# Patient Record
Sex: MALE | Race: WHITE | NOT HISPANIC OR LATINO | Employment: FULL TIME | URBAN - METROPOLITAN AREA
[De-identification: names, ages, dates, MRNs, and addresses within clinical notes are randomized per-mention and may not be internally consistent; named-entity substitution may affect disease eponyms.]

---

## 2019-10-03 ENCOUNTER — HOSPITAL ENCOUNTER (EMERGENCY)
Facility: HOSPITAL | Age: 49
Discharge: HOME | End: 2019-10-03
Attending: EMERGENCY MEDICINE | Admitting: EMERGENCY MEDICINE

## 2019-10-03 VITALS
HEIGHT: 71 IN | BODY MASS INDEX: 43.4 KG/M2 | RESPIRATION RATE: 16 BRPM | WEIGHT: 310 LBS | OXYGEN SATURATION: 98 % | HEART RATE: 83 BPM | TEMPERATURE: 98.1 F | DIASTOLIC BLOOD PRESSURE: 91 MMHG | SYSTOLIC BLOOD PRESSURE: 153 MMHG

## 2019-10-03 DIAGNOSIS — I10 ESSENTIAL HYPERTENSION: Primary | ICD-10-CM

## 2019-10-03 LAB
ALBUMIN SERPL-MCNC: 4.6 G/DL (ref 3.5–5.2)
ALBUMIN/GLOB SERPL: 1.7 G/DL
ALP SERPL-CCNC: 112 U/L (ref 39–117)
ALT SERPL W P-5'-P-CCNC: 22 U/L (ref 1–41)
ANION GAP SERPL CALCULATED.3IONS-SCNC: 16.1 MMOL/L (ref 5–15)
AST SERPL-CCNC: 19 U/L (ref 1–40)
BILIRUB SERPL-MCNC: 0.3 MG/DL (ref 0.2–1.2)
BUN BLD-MCNC: 10 MG/DL (ref 6–20)
BUN/CREAT SERPL: 9.7 (ref 7–25)
CALCIUM SPEC-SCNC: 9.4 MG/DL (ref 8.6–10.5)
CHLORIDE SERPL-SCNC: 101 MMOL/L (ref 98–107)
CO2 SERPL-SCNC: 28.9 MMOL/L (ref 22–29)
CREAT BLD-MCNC: 1.03 MG/DL (ref 0.76–1.27)
GFR SERPL CREATININE-BSD FRML MDRD: 77 ML/MIN/1.73
GLOBULIN UR ELPH-MCNC: 2.7 GM/DL
GLUCOSE BLD-MCNC: 90 MG/DL (ref 65–99)
POTASSIUM BLD-SCNC: 3.6 MMOL/L (ref 3.5–5.2)
PROT SERPL-MCNC: 7.3 G/DL (ref 6–8.5)
SODIUM BLD-SCNC: 146 MMOL/L (ref 136–145)
TROPONIN T SERPL-MCNC: <0.01 NG/ML (ref 0–0.03)

## 2019-10-03 PROCEDURE — 93005 ELECTROCARDIOGRAM TRACING: CPT | Performed by: EMERGENCY MEDICINE

## 2019-10-03 PROCEDURE — 80053 COMPREHEN METABOLIC PANEL: CPT | Performed by: EMERGENCY MEDICINE

## 2019-10-03 PROCEDURE — 93010 ELECTROCARDIOGRAM REPORT: CPT | Performed by: INTERNAL MEDICINE

## 2019-10-03 PROCEDURE — 93005 ELECTROCARDIOGRAM TRACING: CPT

## 2019-10-03 PROCEDURE — 99284 EMERGENCY DEPT VISIT MOD MDM: CPT

## 2019-10-03 PROCEDURE — 84484 ASSAY OF TROPONIN QUANT: CPT | Performed by: EMERGENCY MEDICINE

## 2019-10-03 RX ORDER — ASPIRIN 81 MG/1
324 TABLET, CHEWABLE ORAL ONCE
Status: COMPLETED | OUTPATIENT
Start: 2019-10-03 | End: 2019-10-03

## 2019-10-03 RX ORDER — CLONIDINE HYDROCHLORIDE 0.1 MG/1
0.1 TABLET ORAL EVERY 12 HOURS PRN
Qty: 10 TABLET | Refills: 0 | Status: SHIPPED | OUTPATIENT
Start: 2019-10-03 | End: 2019-10-10 | Stop reason: SDUPTHER

## 2019-10-03 RX ORDER — CLONIDINE HYDROCHLORIDE 0.1 MG/1
0.1 TABLET ORAL ONCE
Status: COMPLETED | OUTPATIENT
Start: 2019-10-03 | End: 2019-10-03

## 2019-10-03 RX ORDER — AMLODIPINE BESYLATE AND BENAZEPRIL HYDROCHLORIDE 5; 10 MG/1; MG/1
1 CAPSULE ORAL DAILY
COMMUNITY
End: 2020-11-25

## 2019-10-03 RX ORDER — ROSUVASTATIN CALCIUM 20 MG/1
40 TABLET, COATED ORAL DAILY
COMMUNITY
End: 2021-01-08

## 2019-10-03 RX ORDER — LOSARTAN POTASSIUM AND HYDROCHLOROTHIAZIDE 25; 100 MG/1; MG/1
1 TABLET ORAL DAILY
COMMUNITY
End: 2021-01-08

## 2019-10-03 RX ORDER — OMEPRAZOLE 40 MG/1
40 CAPSULE, DELAYED RELEASE ORAL DAILY
COMMUNITY
End: 2022-07-06

## 2019-10-03 RX ADMIN — ASPIRIN 324 MG: 81 TABLET, CHEWABLE ORAL at 18:19

## 2019-10-03 RX ADMIN — CLONIDINE HYDROCHLORIDE 0.1 MG: 0.1 TABLET ORAL at 18:19

## 2019-10-03 NOTE — ED PROVIDER NOTES
" EMERGENCY DEPARTMENT ENCOUNTER    CHIEF COMPLAINT  Chief Complaint: Hypertension  History given by: pt  History limited by: none  Room Number: 22/22  PMD: Provider, No Known      HPI:  Pt is a 49 y.o. male who presents complaining of worsening of chronic hypertension (200/105) that started last night. Pt admits to associated HA. Pt states he started a new hypertension medication  (5mg amlodipine daily) yesterday morning due to a BP of 190/90 at PCP. Pt states today he started to have chest \"fluttering\" pain, dizziness, and felt anxious. Pt states his symptoms have become progressively better since arriving at the ED.    Duration:  yesterday  Onset: gradual  Timing: constant  Quality: hypertension (200/105)  Intensity/Severity: moderate  Progression: progressively better  Associated Symptoms: HA, chest \"fluttering\" pain, dizziness, and felt anxious  Aggravating Factors: none  Alleviating Factors: none  Previous Episodes: Hx of hypertension  Treatment before arrival: none    PAST MEDICAL HISTORY  Active Ambulatory Problems     Diagnosis Date Noted   • Acute maxillary sinusitis 02/27/2016   • Acute pharyngitis 02/27/2016   • Atopic rhinitis 02/27/2016   • Mixed anxiety depressive disorder 02/27/2016   • Elevated blood pressure 02/27/2016   • Fatigue 02/27/2016   • Gastroesophageal reflux disease with esophagitis 02/27/2016   • Hyperlipidemia 02/27/2016   • Hypertension 02/27/2016   • Papillary carcinoma of thyroid (CMS/HCC) 02/27/2016   • Cobalamin deficiency 02/27/2016   • Vitamin D deficiency 02/27/2016     Resolved Ambulatory Problems     Diagnosis Date Noted   • No Resolved Ambulatory Problems     Past Medical History:   Diagnosis Date   • Diabetes mellitus (CMS/HCC)    • Disease of thyroid gland    • Hyperlipidemia    • Hypertension    • Pneumonia    • thyroid        PAST SURGICAL HISTORY  Past Surgical History:   Procedure Laterality Date   • GASTRECTOMY     • THYROID SURGERY         FAMILY HISTORY  History " "reviewed. No pertinent family history.    SOCIAL HISTORY  Social History     Socioeconomic History   • Marital status:      Spouse name: Not on file   • Number of children: Not on file   • Years of education: Not on file   • Highest education level: Not on file   Tobacco Use   • Smoking status: Never Smoker   Substance and Sexual Activity   • Alcohol use: Yes   • Drug use: No       ALLERGIES  Penicillins    REVIEW OF SYSTEMS  Review of Systems   Constitutional: Negative for activity change, appetite change and fever.   HENT: Negative for congestion and sore throat.    Eyes: Negative.    Respiratory: Negative for cough and shortness of breath.    Cardiovascular: Positive for chest pain (\"fluttering\"). Negative for leg swelling.        Hypertension (200s/105)   Gastrointestinal: Negative for abdominal pain, diarrhea and vomiting.   Endocrine: Negative.    Genitourinary: Negative for decreased urine volume and dysuria.   Musculoskeletal: Negative for neck pain.   Skin: Negative for rash and wound.   Allergic/Immunologic: Negative.    Neurological: Positive for dizziness and headaches (\"pressure\"). Negative for weakness and numbness.   Hematological: Negative.    Psychiatric/Behavioral: The patient is nervous/anxious.    All other systems reviewed and are negative.      PHYSICAL EXAM  ED Triage Vitals [10/03/19 1452]   Temp Heart Rate Resp BP SpO2   98.1 °F (36.7 °C) 94 18 156/93 98 %      Temp src Heart Rate Source Patient Position BP Location FiO2 (%)   -- -- Lying Left arm --       Physical Exam   Constitutional: He is oriented to person, place, and time. No distress.   HENT:   Head: Normocephalic and atraumatic.   Eyes: Conjunctivae and EOM are normal. Pupils are equal, round, and reactive to light. No scleral icterus.   Neck: Normal range of motion. Neck supple.   Scar to left lateral neck due to recent thyroid surgery   Cardiovascular: Normal rate, regular rhythm and normal heart sounds.   No murmur " heard.  Pulmonary/Chest: Effort normal and breath sounds normal. No respiratory distress. He has no wheezes. He has no rales.   Abdominal: Soft. There is no tenderness. There is no rebound and no guarding.   Musculoskeletal: Normal range of motion. He exhibits no edema (LE) or tenderness (LE).   Neurological: He is alert and oriented to person, place, and time. He has normal sensation and normal strength.   Skin: Skin is warm and dry. No pallor.   Psychiatric: Mood and affect normal.   Nursing note and vitals reviewed.      LAB RESULTS  Lab Results (last 24 hours)     Procedure Component Value Units Date/Time    Comprehensive Metabolic Panel [[id number]]  (Abnormal) Collected:  10/03/19 1815    Specimen:  Blood Updated:  10/03/19 1857     Glucose 90 mg/dL      BUN 10 mg/dL      Creatinine 1.03 mg/dL      Sodium 146 mmol/L      Potassium 3.6 mmol/L      Chloride 101 mmol/L      CO2 28.9 mmol/L      Calcium 9.4 mg/dL      Total Protein 7.3 g/dL      Albumin 4.60 g/dL      ALT (SGPT) 22 U/L      AST (SGOT) 19 U/L      Alkaline Phosphatase 112 U/L      Total Bilirubin 0.3 mg/dL      eGFR Non African Amer 77 mL/min/1.73      Globulin 2.7 gm/dL      A/G Ratio 1.7 g/dL      BUN/Creatinine Ratio 9.7     Anion Gap 16.1 mmol/L     Narrative:       GFR Normal >60  Chronic Kidney Disease <60  Kidney Failure <15    Troponin [[id number]]  (Normal) Collected:  10/03/19 1815    Specimen:  Blood Updated:  10/03/19 1854     Troponin T <0.010 ng/mL     Narrative:       Troponin T Reference Range:  <= 0.03 ng/mL-   Negative for AMI  >0.03 ng/mL-     Abnormal for myocardial necrosis.  Clinicians would have to utilize clinical acumen, EKG, Troponin and serial changes to determine if it is an Acute Myocardial Infarction or myocardial injury due to an underlying chronic condition.           I ordered the above labs and reviewed the results        PROCEDURES  Procedures    EKG           EKG time: 1500  Rhythm/Rate: sinus 75-80  P waves and  DC: RICKEY within normal limits  QRS, axis: Narrow QRS complex,    ST and T waves: No STEMI  QTC is within normal limits    Interpreted Contemporaneously by me, independently viewed  Comparison: None available.        PROGRESS AND CONSULTS     1737  Discussed plan to do labs and EKG. Discussed that if this workup is normal, the pt will likely be discharged with clonidine. Pt understands and agrees with the plan. All questions have been answered.    1904  Rechecked patient who is resting. BP - 143/90, HR - 91, O2 - 97%.  Discussed all lab and EKG results. Discussed plan to discharge the pt and have the pt follow up with PCP. Pt understands and agrees with the plan. All questions have been answered.        MEDICAL DECISION MAKING  Results were reviewed/discussed with the patient and they were also made aware of online access. Pt also made aware that some labs, such as cultures, will not be resulted during ER visit and follow up with PMD is necessary.     MDM  Number of Diagnoses or Management Options     Amount and/or Complexity of Data Reviewed  Clinical lab tests: ordered and reviewed (Negative Troponin)  Tests in the medicine section of CPT®: ordered and reviewed (Refer to the procedure section of the note for EKG results)  Decide to obtain previous medical records or to obtain history from someone other than the patient: yes (Epic)           DIAGNOSIS  Final diagnoses:   Essential hypertension       DISPOSITION  DISCHARGE    Patient discharged in stable condition.    Reviewed implications of results, diagnosis, meds, responsibility to follow up, warning signs and symptoms of possible worsening, potential complications and reasons to return to ER.    Patient/Family voiced understanding of above instructions.    Discussed plan for discharge, as there is no emergent indication for admission. Patient referred to primary care provider for BP management due to today's BP. Pt/family is agreeable and understands need for  follow up and repeat testing.  Pt is aware that discharge does not mean that nothing is wrong but it indicates no emergency is present that requires admission and they must continue care with follow-up as given below or physician of their choice.     FOLLOW-UP  Kendall Lopez,   [address]  [address] [address]  [phone]    Schedule an appointment as soon as possible for a visit in 1 week           Medication List      New Prescriptions    cloNIDine 0.1 MG tablet  Commonly known as:  CATAPRES  Take 1 tablet by mouth Every 12 (Twelve) Hours As Needed for High Blood   Pressure (SBP > 160 mmHg, DBP > 100 mmHg).              Latest Documented Vital Signs:  As of 7:04 PM  BP- 145/92 HR- 71 Temp- 98.1 °F (36.7 °C) O2 sat- 97%    --  Documentation assistance provided by olena Deras for Dr Greenfield.  Information recorded by the scribe was done at my direction and has been verified and validated by me.       Julia Deras  10/03/19 1905       Baldo Greenfield MD  10/03/19 1929

## 2019-10-03 NOTE — ED NOTES
"Pt reports that his Md started pt on Amlodipine this week. Pt reports that yesterday he felt that his Bp was high. Pt reports he took his Bp at home and it was high \"(200/105)\" pt reports that today he still felt a headache, dizzy, and fluttering in his chest.      Colton Santiago RN  10/03/19 1813    "

## 2019-10-03 NOTE — ED NOTES
Pt to ER for high blood pressure and headache since yesterday. Started amlodipine on Wednesday.      Jackson Alvarez, RN  10/03/19 1443       Jackson Alvarez RN  10/03/19 2820

## 2019-10-07 ENCOUNTER — OFFICE VISIT (OUTPATIENT)
Dept: FAMILY MEDICINE CLINIC | Facility: CLINIC | Age: 49
End: 2019-10-07

## 2019-10-07 VITALS
HEART RATE: 81 BPM | BODY MASS INDEX: 44.1 KG/M2 | TEMPERATURE: 98.8 F | HEIGHT: 71 IN | OXYGEN SATURATION: 99 % | SYSTOLIC BLOOD PRESSURE: 140 MMHG | DIASTOLIC BLOOD PRESSURE: 86 MMHG | WEIGHT: 315 LBS

## 2019-10-07 DIAGNOSIS — E78.5 HYPERLIPIDEMIA, UNSPECIFIED HYPERLIPIDEMIA TYPE: Primary | ICD-10-CM

## 2019-10-07 DIAGNOSIS — Z00.01 ENCOUNTER FOR GENERAL ADULT MEDICAL EXAMINATION WITH ABNORMAL FINDINGS: ICD-10-CM

## 2019-10-07 DIAGNOSIS — I10 ESSENTIAL HYPERTENSION: ICD-10-CM

## 2019-10-07 PROCEDURE — 99213 OFFICE O/P EST LOW 20 MIN: CPT | Performed by: NURSE PRACTITIONER

## 2019-10-08 LAB
BASOPHILS # BLD AUTO: 0.06 10*3/MM3 (ref 0–0.2)
BASOPHILS NFR BLD AUTO: 0.8 % (ref 0–1.5)
CHOLEST SERPL-MCNC: 117 MG/DL (ref 0–200)
EOSINOPHIL # BLD AUTO: 0.21 10*3/MM3 (ref 0–0.4)
EOSINOPHIL NFR BLD AUTO: 2.6 % (ref 0.3–6.2)
ERYTHROCYTE [DISTWIDTH] IN BLOOD BY AUTOMATED COUNT: 14.6 % (ref 12.3–15.4)
HBA1C MFR BLD: 6 % (ref 4.8–5.6)
HCT VFR BLD AUTO: 41.3 % (ref 37.5–51)
HDLC SERPL-MCNC: 33 MG/DL (ref 40–60)
HGB BLD-MCNC: 13.9 G/DL (ref 13–17.7)
IMM GRANULOCYTES # BLD AUTO: 0.08 10*3/MM3 (ref 0–0.05)
IMM GRANULOCYTES NFR BLD AUTO: 1 % (ref 0–0.5)
LDLC SERPL CALC-MCNC: 58 MG/DL (ref 0–100)
LYMPHOCYTES # BLD AUTO: 0.72 10*3/MM3 (ref 0.7–3.1)
LYMPHOCYTES NFR BLD AUTO: 9 % (ref 19.6–45.3)
MCH RBC QN AUTO: 26.9 PG (ref 26.6–33)
MCHC RBC AUTO-ENTMCNC: 33.7 G/DL (ref 31.5–35.7)
MCV RBC AUTO: 79.9 FL (ref 79–97)
MONOCYTES # BLD AUTO: 0.62 10*3/MM3 (ref 0.1–0.9)
MONOCYTES NFR BLD AUTO: 7.8 % (ref 5–12)
NEUTROPHILS # BLD AUTO: 6.27 10*3/MM3 (ref 1.7–7)
NEUTROPHILS NFR BLD AUTO: 78.8 % (ref 42.7–76)
NRBC BLD AUTO-RTO: 0 /100 WBC (ref 0–0.2)
PLATELET # BLD AUTO: 169 10*3/MM3 (ref 140–450)
RBC # BLD AUTO: 5.17 10*6/MM3 (ref 4.14–5.8)
TRIGL SERPL-MCNC: 129 MG/DL (ref 0–150)
TSH SERPL DL<=0.005 MIU/L-ACNC: 0.02 UIU/ML (ref 0.27–4.2)
VLDLC SERPL CALC-MCNC: 25.8 MG/DL
WBC # BLD AUTO: 7.96 10*3/MM3 (ref 3.4–10.8)

## 2019-10-10 DIAGNOSIS — I10 HYPERTENSION, UNSPECIFIED TYPE: ICD-10-CM

## 2019-10-10 DIAGNOSIS — I10 HYPERTENSION, UNSPECIFIED TYPE: Primary | ICD-10-CM

## 2019-10-10 RX ORDER — CLONIDINE HYDROCHLORIDE 0.1 MG/1
0.1 TABLET ORAL EVERY 12 HOURS PRN
Qty: 30 TABLET | Refills: 1 | Status: SHIPPED | OUTPATIENT
Start: 2019-10-10 | End: 2019-10-10 | Stop reason: SDUPTHER

## 2019-10-10 NOTE — PROGRESS NOTES
CBC/CMP stable normal HGA1C is prediabetes , TSH is out of range I did not see synthroid on current medication list has patient stop taking ?

## 2019-10-11 RX ORDER — CLONIDINE HYDROCHLORIDE 0.1 MG/1
TABLET ORAL
Qty: 180 TABLET | Refills: 1 | Status: SHIPPED | OUTPATIENT
Start: 2019-10-11 | End: 2019-11-21 | Stop reason: SDUPTHER

## 2019-10-17 RX ORDER — LEVOTHYROXINE SODIUM 175 UG/1
175 TABLET ORAL DAILY
Qty: 30 TABLET | Refills: 0 | Status: SHIPPED | OUTPATIENT
Start: 2019-10-17 | End: 2020-11-18

## 2019-10-17 NOTE — PROGRESS NOTES
TSH medication needs to be adjusted. The TSH is out of range and excessive hormone can cause shakiness, increased appetitie,insominia. Heart Palpitations. Synthroid replacement is lifetime however once yearly the TSH needs to be checked for possible dose adjustment. Then evety 6 wks until the TSH is in normal range

## 2019-11-06 DIAGNOSIS — I10 HYPERTENSION, UNSPECIFIED TYPE: ICD-10-CM

## 2019-11-06 RX ORDER — CLONIDINE HYDROCHLORIDE 0.1 MG/1
TABLET ORAL
Qty: 30 TABLET | Refills: 0 | Status: SHIPPED | OUTPATIENT
Start: 2019-11-06 | End: 2019-11-21 | Stop reason: SDUPTHER

## 2019-11-21 DIAGNOSIS — I10 HYPERTENSION, UNSPECIFIED TYPE: ICD-10-CM

## 2019-11-21 RX ORDER — CLONIDINE HYDROCHLORIDE 0.1 MG/1
TABLET ORAL
Qty: 30 TABLET | Refills: 0 | Status: SHIPPED | OUTPATIENT
Start: 2019-11-21 | End: 2019-12-06 | Stop reason: SDUPTHER

## 2019-12-06 DIAGNOSIS — I10 HYPERTENSION, UNSPECIFIED TYPE: ICD-10-CM

## 2019-12-06 RX ORDER — CLONIDINE HYDROCHLORIDE 0.1 MG/1
TABLET ORAL
Qty: 30 TABLET | Refills: 0 | Status: SHIPPED | OUTPATIENT
Start: 2019-12-06 | End: 2019-12-19

## 2019-12-19 DIAGNOSIS — I10 HYPERTENSION, UNSPECIFIED TYPE: ICD-10-CM

## 2019-12-19 RX ORDER — CLONIDINE HYDROCHLORIDE 0.1 MG/1
TABLET ORAL
Qty: 30 TABLET | Refills: 0 | Status: SHIPPED | OUTPATIENT
Start: 2019-12-19 | End: 2019-12-20

## 2019-12-20 RX ORDER — CLONIDINE HYDROCHLORIDE 0.1 MG/1
TABLET ORAL
Qty: 180 TABLET | Refills: 0 | Status: SHIPPED | OUTPATIENT
Start: 2019-12-20 | End: 2020-05-04

## 2020-05-04 DIAGNOSIS — I10 HYPERTENSION, UNSPECIFIED TYPE: ICD-10-CM

## 2020-05-04 RX ORDER — CLONIDINE HYDROCHLORIDE 0.1 MG/1
TABLET ORAL
Qty: 180 TABLET | Refills: 0 | Status: SHIPPED | OUTPATIENT
Start: 2020-05-04 | End: 2020-08-03

## 2020-08-01 DIAGNOSIS — I10 HYPERTENSION, UNSPECIFIED TYPE: ICD-10-CM

## 2020-08-03 RX ORDER — CLONIDINE HYDROCHLORIDE 0.1 MG/1
0.1 TABLET ORAL 2 TIMES DAILY
Qty: 180 TABLET | Refills: 0 | Status: SHIPPED | OUTPATIENT
Start: 2020-08-03 | End: 2020-11-18 | Stop reason: SDUPTHER

## 2020-11-07 DIAGNOSIS — I10 HYPERTENSION, UNSPECIFIED TYPE: ICD-10-CM

## 2020-11-09 RX ORDER — CLONIDINE HYDROCHLORIDE 0.1 MG/1
TABLET ORAL
Qty: 60 TABLET | Refills: 0 | OUTPATIENT
Start: 2020-11-09

## 2020-11-18 ENCOUNTER — OFFICE VISIT (OUTPATIENT)
Dept: FAMILY MEDICINE CLINIC | Facility: CLINIC | Age: 50
End: 2020-11-18

## 2020-11-18 VITALS
WEIGHT: 308 LBS | HEART RATE: 80 BPM | TEMPERATURE: 97.7 F | DIASTOLIC BLOOD PRESSURE: 74 MMHG | BODY MASS INDEX: 43.12 KG/M2 | OXYGEN SATURATION: 98 % | HEIGHT: 71 IN | SYSTOLIC BLOOD PRESSURE: 112 MMHG

## 2020-11-18 DIAGNOSIS — C80.1 ANXIETY ASSOCIATED WITH CANCER DIAGNOSIS (HCC): ICD-10-CM

## 2020-11-18 DIAGNOSIS — F41.1 ANXIETY ASSOCIATED WITH CANCER DIAGNOSIS (HCC): ICD-10-CM

## 2020-11-18 DIAGNOSIS — I10 HYPERTENSION, UNSPECIFIED TYPE: ICD-10-CM

## 2020-11-18 DIAGNOSIS — C73 PAPILLARY CARCINOMA OF THYROID (HCC): Primary | ICD-10-CM

## 2020-11-18 DIAGNOSIS — C73: ICD-10-CM

## 2020-11-18 PROBLEM — K44.9 HIATAL HERNIA: Status: ACTIVE | Noted: 2020-11-18

## 2020-11-18 PROBLEM — K57.90 DIVERTICULAR DISEASE: Status: ACTIVE | Noted: 2020-11-18

## 2020-11-18 PROBLEM — K21.9 GASTROESOPHAGEAL REFLUX DISEASE: Status: ACTIVE | Noted: 2020-11-18

## 2020-11-18 PROCEDURE — 99214 OFFICE O/P EST MOD 30 MIN: CPT | Performed by: NURSE PRACTITIONER

## 2020-11-18 RX ORDER — LEVOTHYROXINE SODIUM 300 UG/1
300 TABLET ORAL DAILY
Start: 2020-11-18 | End: 2021-01-08

## 2020-11-18 RX ORDER — CLONIDINE HYDROCHLORIDE 0.1 MG/1
0.1 TABLET ORAL 2 TIMES DAILY
Qty: 180 TABLET | Refills: 1
Start: 2020-11-18 | End: 2021-02-01 | Stop reason: SDUPTHER

## 2020-11-18 RX ORDER — ALPRAZOLAM 0.5 MG/1
0.25 TABLET ORAL
COMMUNITY
Start: 2020-10-12 | End: 2022-07-06

## 2020-11-18 NOTE — PROGRESS NOTES
"Subjective   Mika Gabriel is a 50 y.o. male Follow up from Lung surgery    History of Present Illness   A lot nerve pain in L ribs and shoulder, since wedge resection in October. His surgeon has told him that is not unusual.  Initially diagnosed thyroid cancer 2014. Has followed with Dr. Hodges all through the years. Recently changed rest of his team. Dr. Fofana is leading treatment plan. Dr. Shukla for radiology. Plan is radiation in the spring, possible immunotherapy. Concerned about the time frame of watch and wait times. As 3rd recurrence--now in lung.  Kids 10 and 15.  adonis patient--I have never seen previous. He would like PCP more involved in current care.   The following portions of the patient's history were reviewed and updated as appropriate: allergies, current medications, past family history, past medical history, past social history, past surgical history and problem list.    Review of Systems   Constitutional: Positive for activity change and fatigue. Negative for appetite change, unexpected weight gain and unexpected weight loss.   Respiratory: Positive for chest tightness and wheezing. Negative for cough, choking and shortness of breath.    Cardiovascular: Negative.    Gastrointestinal: Negative for abdominal pain.   Endocrine: Negative.    Musculoskeletal: Positive for arthralgias. Negative for gait problem and neck stiffness.   Skin: Positive for skin lesions.   Allergic/Immunologic: Negative.    Neurological: Positive for numbness. Negative for weakness and headache.   Hematological: Negative for adenopathy.   Psychiatric/Behavioral: Positive for sleep disturbance, depressed mood and stress. Negative for agitation, decreased concentration, self-injury and suicidal ideas. The patient is not nervous/anxious.      /74   Pulse 80   Temp 97.7 °F (36.5 °C)   Ht 180.3 cm (71\")   Wt (!) 140 kg (308 lb)   SpO2 98%   BMI 42.96 kg/m²     Objective   Physical Exam  Constitutional:       " Appearance: He is well-developed. He is not diaphoretic.   HENT:      Head: Normocephalic and atraumatic.   Neck:      Musculoskeletal: Normal range of motion and neck supple.      Thyroid: No thyromegaly.   Cardiovascular:      Rate and Rhythm: Normal rate and regular rhythm.      Pulses:           Carotid pulses are 2+ on the right side and 2+ on the left side.     Heart sounds: Normal heart sounds.   Pulmonary:      Effort: Pulmonary effort is normal.      Breath sounds: Normal breath sounds and air entry.   Chest:      Chest wall: There is no dullness to percussion.       Lymphadenopathy:      Cervical: No cervical adenopathy.      Upper Body:      Right upper body: No supraclavicular, axillary or pectoral adenopathy.      Left upper body: No supraclavicular, axillary or pectoral adenopathy.   Neurological:      Comments: Dysesthesia to L Lower ribs   Psychiatric:         Behavior: Behavior normal.         Thought Content: Thought content normal.         Judgment: Judgment normal.       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 (CMS/HCC)    Relevant Medications    levothyroxine (Synthroid) 300 MCG tablet      Other Visit Diagnoses     Anxiety associated with cancer diagnosis        Relevant Medications    ALPRAZolam (XANAX) 0.5 MG tablet      Diagnoses       Codes Comments    Papillary carcinoma of thyroid (CMS/HCC)    -  Primary ICD-10-CM: C73  ICD-9-CM: 193     Hypertension, unspecified type     ICD-10-CM: I10  ICD-9-CM: 401.9     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 After long discussion and review of records, may need second opinion as 3rd recurrence. Will discuss with Dr Hodges as well, next month.   HTN--well controlled. Continue present plan.  Anxiety--very appropriate to consider options based on age, family status, and recurrence. Supportive coaching given  As wants PCP more involved, recommend he message me once talks with Dr. Hodges his endo, schedule 6 month FU  30 min EM, discussion of options and chart review. All face to face with patient whom is new to me

## 2021-01-08 ENCOUNTER — TELEPHONE (OUTPATIENT)
Dept: FAMILY MEDICINE CLINIC | Facility: CLINIC | Age: 51
End: 2021-01-08

## 2021-01-08 ENCOUNTER — OFFICE VISIT (OUTPATIENT)
Dept: FAMILY MEDICINE CLINIC | Facility: CLINIC | Age: 51
End: 2021-01-08

## 2021-01-08 VITALS
HEART RATE: 98 BPM | HEIGHT: 71 IN | OXYGEN SATURATION: 99 % | TEMPERATURE: 98 F | SYSTOLIC BLOOD PRESSURE: 168 MMHG | BODY MASS INDEX: 43.54 KG/M2 | DIASTOLIC BLOOD PRESSURE: 88 MMHG | WEIGHT: 311 LBS

## 2021-01-08 DIAGNOSIS — R09.89 PLEURAL FRICTION RUB: ICD-10-CM

## 2021-01-08 DIAGNOSIS — J98.4 LUNG ABNORMALITY: Primary | ICD-10-CM

## 2021-01-08 DIAGNOSIS — C73: ICD-10-CM

## 2021-01-08 PROCEDURE — 71046 X-RAY EXAM CHEST 2 VIEWS: CPT | Performed by: NURSE PRACTITIONER

## 2021-01-08 PROCEDURE — 99214 OFFICE O/P EST MOD 30 MIN: CPT | Performed by: NURSE PRACTITIONER

## 2021-01-08 PROCEDURE — 93000 ELECTROCARDIOGRAM COMPLETE: CPT | Performed by: NURSE PRACTITIONER

## 2021-01-08 RX ORDER — AMLODIPINE BESYLATE 5 MG/1
TABLET ORAL
COMMUNITY
Start: 2020-12-13 | End: 2021-08-23

## 2021-01-08 RX ORDER — METHYLPREDNISOLONE 4 MG/1
TABLET ORAL
Qty: 21 TABLET | Refills: 0 | Status: SHIPPED | OUTPATIENT
Start: 2021-01-08 | End: 2021-07-21

## 2021-01-08 RX ORDER — LEVOTHYROXINE SODIUM 175 UG/1
175 TABLET ORAL 2 TIMES DAILY
COMMUNITY

## 2021-01-08 RX ORDER — LEVOTHYROXINE SODIUM 175 MCG
TABLET ORAL
COMMUNITY
Start: 2020-12-20 | End: 2021-01-08

## 2021-01-08 RX ORDER — LOSARTAN POTASSIUM 100 MG/1
100 TABLET ORAL DAILY
COMMUNITY

## 2021-01-08 RX ORDER — SEMAGLUTIDE 1.34 MG/ML
INJECTION, SOLUTION SUBCUTANEOUS WEEKLY
COMMUNITY
End: 2022-07-06 | Stop reason: SDUPTHER

## 2021-01-08 RX ORDER — ROSUVASTATIN CALCIUM 40 MG/1
40 TABLET, COATED ORAL DAILY
COMMUNITY

## 2021-01-08 NOTE — TELEPHONE ENCOUNTER
Caller: Mika Gabriel    Relationship to patient: Self    Best call back number: [phone]    Patient is needing: PATIENT IS CALLING IN REGARDS TO HIM HAVING A FLUTTER IN HIS LUNGS. HE FEELS LIKE ITS FLUID IN HIS LUNGS. HE STATED THAT IT DOES NOT HURT SO HE DOES NOT WANT TO GO TO THE ER. HE WOULD LIKE TO KNOW IF HE COULD GET MEDICAL ADVISE SINCE HE WAS JUST SEEN LAST MONTH BY HER. HE IS OKAY WITH MAKING AN APPOINTMENT TO BE SEEN BUT WOULD RATHER HAVE SOMEONE CALL HIM FIRST AND SPEAK TO HIM IN REGARDS TO THIS.     PLEASE ADVISE

## 2021-01-08 NOTE — PROGRESS NOTES
"Subjective   Mika Gabriel is a 50 y.o. male who presents c/o flutter in left lung that occurs with a deep breath. Started yesterday.     History of Present Illness   Having a rattle and a tightness in deep lower chest wall that feels like early in surgery healing process. Had VAT procedure with L wedge resection in October for recurrent thyroid cancer. This sensation started suddenly today when he was sitting at his desk  Does not feel sick in any way. Has coughed a few times voluntarily to try to get rattle to go away. No fever. No real pain, just annoying. No SOA  The following portions of the patient's history were reviewed and updated as appropriate: allergies, current medications, past family history, past medical history, past social history, past surgical history and problem list.    Review of Systems   Constitutional: Positive for fatigue. Negative for activity change, appetite change, chills, fever and unexpected weight change.   HENT: Negative.    Eyes: Negative.  Negative for visual disturbance.   Respiratory: Positive for chest tightness. Negative for cough and shortness of breath.    Cardiovascular: Negative.  Negative for chest pain.   Gastrointestinal: Negative.  Negative for constipation and diarrhea.   Endocrine: Negative.    Genitourinary: Negative for difficulty urinating and frequency.   Musculoskeletal: Negative.    Skin: Negative.    Neurological: Negative for weakness and headaches.   Hematological: Negative.    Psychiatric/Behavioral: Negative.  Negative for dysphoric mood.     /88   Pulse 98   Temp 98 °F (36.7 °C) (Infrared)   Ht 180.3 cm (71\")   Wt (!) 141 kg (311 lb)   SpO2 99%   BMI 43.38 kg/m²     Objective   Physical Exam  Constitutional:       Appearance: He is obese. He is not ill-appearing.   Cardiovascular:      Rate and Rhythm: Tachycardia present.      Heart sounds: Normal heart sounds. No murmur. No friction rub.   Pulmonary:      Effort: Pulmonary effort is " normal. No respiratory distress.      Comments: L base pleural friction rub  Chest:      Chest wall: No tenderness.   Neurological:      Mental Status: He is alert.       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 I want him to keep track. Goal <135/85 favor mostly anxiety secondary to worry about the lung and greatly improved after exam.  Recurrence thyroid cancer--s/p wedge. Genetic testing +for 4 possible chemo agents as failed radiation 2 x. He meets with radiation oncology in the next few weeks for final decision on next therapy      ECG 12 Lead    Date/Time: 1/8/2021 12:26 PM  Performed by: Leonor Vega APRN  Authorized by: Leonor Vega APRN   Comparison: compared with previous ECG from 10/3/2019  Similar to previous ECG  Rhythm: sinus rhythm  Rate: normal  Conduction: conduction normal  Q waves: III    ST Segments: ST segments normal  T inversion: aVR  QRS axis: normal  Other: no other findings    Clinical impression: normal ECG            .   Answers for HPI/ROS submitted by the patient on 1/8/2021   What is the primary reason for your visit?: Other  Please describe your symptoms.:  Fluttery feeling in lung.  I had a wedge removed in October and currently have thyroid cancer matastisizee to my lungs. This flutter is new  Have you had these symptoms before?: No  How long have you been having these symptoms?: 1-4 days  Please describe any probable cause for these symptoms. : Left lung wedge on October 27th.

## 2021-01-08 NOTE — TELEPHONE ENCOUNTER
"Patient says he feels a \"flutter\" in lung when taking a deep breath. This is on the side where the lobectomy was performed. He has put in a call to pulmonologist but he was released from this doctor weeks ago and does not know if he will help. There is no pain. Feels more like fluid build up. He is not short of breath and does not feel bad. Last time there was fluid in lung he had pneumonia and felt bad but he does not feel bad today.   "

## 2021-02-01 DIAGNOSIS — I10 HYPERTENSION, UNSPECIFIED TYPE: ICD-10-CM

## 2021-02-01 RX ORDER — CLONIDINE HYDROCHLORIDE 0.1 MG/1
0.1 TABLET ORAL 2 TIMES DAILY
Qty: 180 TABLET | Refills: 1
Start: 2021-02-01 | End: 2021-02-08 | Stop reason: SDUPTHER

## 2021-02-08 DIAGNOSIS — I10 HYPERTENSION, UNSPECIFIED TYPE: ICD-10-CM

## 2021-02-08 RX ORDER — CLONIDINE HYDROCHLORIDE 0.1 MG/1
0.1 TABLET ORAL 2 TIMES DAILY
Qty: 180 TABLET | Refills: 1 | Status: CANCELLED
Start: 2021-02-08

## 2021-02-08 RX ORDER — CLONIDINE HYDROCHLORIDE 0.1 MG/1
0.1 TABLET ORAL 2 TIMES DAILY
Qty: 180 TABLET | Refills: 1
Start: 2021-02-08 | End: 2022-07-06

## 2021-04-06 ENCOUNTER — BULK ORDERING (OUTPATIENT)
Dept: CASE MANAGEMENT | Facility: OTHER | Age: 51
End: 2021-04-06

## 2021-04-06 DIAGNOSIS — Z23 IMMUNIZATION DUE: ICD-10-CM

## 2021-06-16 ENCOUNTER — TELEPHONE (OUTPATIENT)
Dept: FAMILY MEDICINE CLINIC | Facility: CLINIC | Age: 51
End: 2021-06-16

## 2021-06-16 NOTE — TELEPHONE ENCOUNTER
Jackie,   Call and check on him as concerned about his swelling.   Did he get ahold of radiation oncologist or Dr. Hodges?   I sent message would consider going ER and call radiation oncology.  RRJ        I'm unsure of which doctor to see.  I would go to Urgent Care, but unsure wuth my case complexity.  As you know I have reoccurring thyroid cancer, have had multiple lymphnodes remived over the years and most recently found the cancer has metastasized to my lungs.  At my last endocrinologist appointment I had some tender lymphnodes on the right side of my neck.  Dr Hodges sent ne for an ultrasound where they found multiple cancerous looking lymphnodes in  my neck, but most were too small for surgery.  Dr Hodges has rescheduled another ultrasound on my neck for next month.   2 days ago, that same right side became extremely swollen and tender.  So much so that it kept me awake through the night last night.  I don't know what to do.  It is swollen and painful, but manageable during the day with advil.  Working on little to no sleep is not.  I have left a message with my endocrinologist Dr Hodges, but dont know if I should try to get in with her, my PCP, Urgent Care, or even call my Radiologist since I have concerns that multiple radiation pills may have jacked up my salivatory glands.  Any guidance would be appreciated.

## 2021-06-21 NOTE — TELEPHONE ENCOUNTER
FYI patient says the swelling has gone down but he has appt with oncology this Friday for a follow up and will be seeing you on 7/16/21.

## 2021-07-16 ENCOUNTER — OFFICE VISIT (OUTPATIENT)
Dept: FAMILY MEDICINE CLINIC | Facility: CLINIC | Age: 51
End: 2021-07-16

## 2021-07-16 VITALS
HEART RATE: 76 BPM | OXYGEN SATURATION: 98 % | SYSTOLIC BLOOD PRESSURE: 142 MMHG | HEIGHT: 71 IN | BODY MASS INDEX: 44.1 KG/M2 | DIASTOLIC BLOOD PRESSURE: 84 MMHG | WEIGHT: 315 LBS | TEMPERATURE: 97.7 F

## 2021-07-16 DIAGNOSIS — F43.21 COMPLICATED GRIEF: ICD-10-CM

## 2021-07-16 DIAGNOSIS — F43.29 POST-TRAUMA RESPONSE: Primary | ICD-10-CM

## 2021-07-16 DIAGNOSIS — Z11.59 NEED FOR HEPATITIS C SCREENING TEST: ICD-10-CM

## 2021-07-16 DIAGNOSIS — Z12.11 COLON CANCER SCREENING: ICD-10-CM

## 2021-07-16 DIAGNOSIS — Z23 IMMUNIZATION DUE: ICD-10-CM

## 2021-07-16 DIAGNOSIS — E08.628: ICD-10-CM

## 2021-07-16 DIAGNOSIS — Z13.220 LIPID SCREENING: ICD-10-CM

## 2021-07-16 DIAGNOSIS — E11.9 COMPREHENSIVE DIABETIC FOOT EXAMINATION, TYPE 2 DM, ENCOUNTER FOR (HCC): ICD-10-CM

## 2021-07-16 DIAGNOSIS — I10 HYPERTENSION, UNSPECIFIED TYPE: ICD-10-CM

## 2021-07-16 PROCEDURE — 99214 OFFICE O/P EST MOD 30 MIN: CPT | Performed by: NURSE PRACTITIONER

## 2021-07-16 PROCEDURE — 90715 TDAP VACCINE 7 YRS/> IM: CPT | Performed by: NURSE PRACTITIONER

## 2021-07-16 PROCEDURE — 90471 IMMUNIZATION ADMIN: CPT | Performed by: NURSE PRACTITIONER

## 2021-07-16 RX ORDER — PROPRANOLOL HCL 60 MG
60 CAPSULE, EXTENDED RELEASE 24HR ORAL DAILY
Qty: 90 CAPSULE | Refills: 1 | Status: SHIPPED | OUTPATIENT
Start: 2021-07-16 | End: 2022-01-17

## 2021-07-16 NOTE — PROGRESS NOTES
"Subjective   Mika Gabriel is a 50 y.o. male who presents for a follow up on hypertension, hyperlipidemia.    History of Present Illness   Dr Hodges thinks additional cancer in neck. Nodules in lungs are stable, has imaging pending. No new treatment at this time  Tremor new in L hand, works and type ok, only in L, can drop objects  Has multiple losses this year--a brother to liver cancer, sister with pancreatic cancer, not seeking treatment, and a brother with thyroid cancer. He has gained weight and struggling to stay composed even at work. Has never sought care or counseling before, but thinks it is time. He is having difficulty processing all this, as typically he would call his brother that just . He is finding himself self isolating at home. Has thought about genetic testing, but has no biological children, so holding for now. No cancer in the parents. Unsure of diabetic control as weight gain and stress.   The following portions of the patient's history were reviewed and updated as appropriate: allergies, current medications, past family history, past medical history, past social history, past surgical history and problem list.    Review of Systems   Constitutional: Positive for activity change, fatigue and unexpected weight change. Negative for chills and fever.   HENT: Negative.    Eyes: Negative.  Negative for visual disturbance.   Respiratory: Negative.    Cardiovascular: Negative.  Negative for chest pain.   Gastrointestinal: Negative.  Negative for constipation and diarrhea.   Endocrine: Negative.    Genitourinary: Negative for difficulty urinating and frequency.   Musculoskeletal: Negative.    Neurological: Negative for weakness and headaches.   Hematological: Negative.    Psychiatric/Behavioral: Positive for dysphoric mood and sleep disturbance. Negative for behavioral problems, self-injury and suicidal ideas.     /84   Pulse 76   Temp 97.7 °F (36.5 °C) (Infrared)   Ht 180.3 cm (71\")   " Wt (!) 143 kg (315 lb)   SpO2 98%   BMI 43.93 kg/m²     Objective   Physical Exam  Constitutional:       Appearance: He is well-developed. He is obese. He is not diaphoretic.   HENT:      Head: Normocephalic and atraumatic.   Neck:      Thyroid: No thyromegaly.   Cardiovascular:      Rate and Rhythm: Normal rate and regular rhythm.      Pulses:           Carotid pulses are 2+ on the right side and 2+ on the left side.     Heart sounds: Normal heart sounds.   Pulmonary:      Effort: Pulmonary effort is normal.      Breath sounds: Normal breath sounds.   Musculoskeletal:      Cervical back: Normal range of motion and neck supple.   Feet:      Right foot:      Protective Sensation: 10 sites sensed.      Skin integrity: Blister present.      Left foot:      Protective Sensation: 10 sites sensed.      Skin integrity: Callus present.      Comments: See scanned  Lymphadenopathy:      Cervical: No cervical adenopathy.   Psychiatric:         Attention and Perception: Attention and perception normal.         Mood and Affect: Mood is depressed. Affect is tearful.         Speech: Speech normal.         Behavior: Behavior normal.         Thought Content: Thought content normal.         Judgment: Judgment normal.       Assessment/Plan   Problems Addressed this Visit        Cardiac and Vasculature    Hypertension    Relevant Medications    propranolol LA (Inderal LA) 60 MG 24 hr capsule    Lipid screening    Relevant Orders    Lipid Panel (Completed)       Endocrine and Metabolic    Diabetes due to underlying condition w oth skin comp (CMS/HCC)    Relevant Orders    Hemoglobin A1c (Completed)    Microalbumin / Creatinine Urine Ratio - Urine, Clean Catch (Completed)    Comprehensive diabetic foot examination, type 2 DM, encounter for (CMS/HCC)       Mental Health    Complicated grief    Post-trauma response - Primary      Other Visit Diagnoses     Colon cancer screening        Relevant Orders    Ambulatory Referral to  Gastroenterology    Need for hepatitis C screening test        Relevant Orders    Hepatitis C Antibody (Completed)    Immunization due        Relevant Orders    Tdap Vaccine Greater Than or Equal To 6yo IM (Completed)      Diagnoses       Codes Comments    Post-trauma response    -  Primary ICD-10-CM: F43.29  ICD-9-CM: 309.9, V62.81     Colon cancer screening     ICD-10-CM: Z12.11  ICD-9-CM: V76.51     Lipid screening     ICD-10-CM: Z13.220  ICD-9-CM: V77.91     Hypertension, unspecified type     ICD-10-CM: I10  ICD-9-CM: 401.9     Need for hepatitis C screening test     ICD-10-CM: Z11.59  ICD-9-CM: V73.89     Immunization due     ICD-10-CM: Z23  ICD-9-CM: V05.9     Diabetes due to underlying condition w oth skin comp (CMS/HCC)     ICD-10-CM: E08.628  ICD-9-CM: 249.80, 709.8     Comprehensive diabetic foot examination, type 2 DM, encounter for (CMS/HCC)     ICD-10-CM: E11.9  ICD-9-CM: 250.00     Complicated grief     ICD-10-CM: F43.21  ICD-9-CM: 309.0         Post trauma response--counseling recommended. Add sertraline to wellbutrin. Resources given  Colon cancer screening--in between treatment plans and due to age, recommend  Lipid screening--update--on high dose statin  HTN--improving control--consider dose adjustment if not to goal at next visit  Screen Hep C--population recommendation  Immunizations--Tdap today, consider pneumovax and Hep B  Diabetes--assess control with weight gain  Diabetic foot exam  Grief--counseling recommended  FU 6 months         Answers for HPI/ROS submitted by the patient on 7/14/2021  What is the primary reason for your visit?: Other  Please describe your symptoms.: 6 month followup  Have you had these symptoms before?: Yes  How long have you been having these symptoms?: Greater than 2 weeks  Please describe any probable cause for these symptoms. : Thyroid cancer, high blood pressure

## 2021-07-17 LAB
ALBUMIN/CREAT UR: 2 MG/G CREAT (ref 0–29)
CHOLEST SERPL-MCNC: 124 MG/DL (ref 100–199)
CREAT UR-MCNC: 136.1 MG/DL
HBA1C MFR BLD: 6.4 % (ref 4.8–5.6)
HCV AB S/CO SERPL IA: <0.1 S/CO RATIO (ref 0–0.9)
HDLC SERPL-MCNC: 27 MG/DL
LDLC SERPL CALC-MCNC: 62 MG/DL (ref 0–99)
MICROALBUMIN UR-MCNC: 3.3 UG/ML
TRIGL SERPL-MCNC: 211 MG/DL (ref 0–149)
VLDLC SERPL CALC-MCNC: 35 MG/DL (ref 5–40)

## 2021-07-19 NOTE — PROGRESS NOTES
Please call the patient regarding his abnormal result. Mild worsening diabetes with A1c 6.4. this likely has made cholesterol worse. Would work on sadness as discussed. And hold changes. Recheck diabetes 3 months

## 2021-07-21 PROBLEM — E11.9 COMPREHENSIVE DIABETIC FOOT EXAMINATION, TYPE 2 DM, ENCOUNTER FOR (HCC): Status: ACTIVE | Noted: 2021-07-21

## 2021-07-21 PROBLEM — Z13.220 LIPID SCREENING: Status: ACTIVE | Noted: 2021-07-21

## 2021-07-21 PROBLEM — F43.21 COMPLICATED GRIEF: Status: ACTIVE | Noted: 2021-07-21

## 2021-07-21 PROBLEM — E08.628: Status: ACTIVE | Noted: 2021-07-21

## 2021-07-21 PROBLEM — F43.29 POST-TRAUMA RESPONSE: Status: ACTIVE | Noted: 2021-07-21

## 2021-08-02 ENCOUNTER — TELEPHONE (OUTPATIENT)
Dept: GASTROENTEROLOGY | Facility: CLINIC | Age: 51
End: 2021-08-02

## 2021-08-23 RX ORDER — AMLODIPINE BESYLATE 10 MG/1
10 TABLET ORAL DAILY
Qty: 90 TABLET | Refills: 0 | Status: SHIPPED | OUTPATIENT
Start: 2021-08-23 | End: 2021-11-15

## 2021-08-24 ENCOUNTER — PREP FOR SURGERY (OUTPATIENT)
Dept: OTHER | Facility: HOSPITAL | Age: 51
End: 2021-08-24

## 2021-08-24 DIAGNOSIS — Z12.11 SCREEN FOR COLON CANCER: Primary | ICD-10-CM

## 2021-09-27 ENCOUNTER — TELEPHONE (OUTPATIENT)
Dept: GASTROENTEROLOGY | Facility: CLINIC | Age: 51
End: 2021-09-27

## 2021-09-27 NOTE — TELEPHONE ENCOUNTER
----- Message from Mika Harvey sent at 2021  9:27 PM EDT -----  Regarding: RE: Non-Urgent Medical Question  Contact: [phone]  Anytime other than .    ----- Message -----  From: GABRIEL LANCE  Sent: 21 9:51 AM  To: Mika Gabriel  Subject: RE: Non-Urgent Medical Question    Hi Mr. Gabriel, my name is Jovita and I am Dr. Pickett's new . I left you a voice mail but if this route is best, we can get your colonoscopy scheduled. He is currently scheduling out into  for surgeries. He does scopes on Monday, Tuesday, Thursday, and . Do you have a preference or a certain day or first availability?      ----- Message -----       From:Mikasrinivasan Gabriel       Sent:2021  8:45 AM EDT         To:Steve Pickett MD    Subject:Non-Urgent Medical Question    I was called 3 weeks ago to setup a colonoscopy, but was unable to answer the original call so I got a voicemail.  Since then I have attempted to call back and schedule the colonoscopy either to go to a voicemail that is never returned or to be placed on an indefinite hold.  My paperwork has been submitted, what is the best path forward to get a colonoscopy scheduled?    I appreciate your time.    Tommy Harvey  : 9-

## 2021-09-29 NOTE — TELEPHONE ENCOUNTER
CLIFF patient via [employer] for colonscopy. Scheduled at [facility] for 12/20/2021 arrival at 1:00pm. Prep packet also sent to patient via [employer]. Spoke with Lynn---Marie

## 2021-10-04 ENCOUNTER — TELEPHONE (OUTPATIENT)
Dept: FAMILY MEDICINE CLINIC | Facility: CLINIC | Age: 51
End: 2021-10-04

## 2021-10-04 NOTE — TELEPHONE ENCOUNTER
----- Message from LAURA Gutierrez sent at 9/30/2021  7:48 PM EDT -----  Regarding: FW: Prescription Question  Contact: [phone]      ----- Message -----  From: Jackie Valdez MA  Sent: 9/29/2021   1:39 PM EDT  To: LAURA Gutierrez  Subject: FW: Prescription Question                          ----- Message -----  From: Mika Gabriel  Sent: 9/29/2021   1:24 PM EDT  To: Harriett Severino [facility]  Subject: Prescription Question                            I am still taking 100mg of Zoloft and my general happiness and state of mind has been better.  Between that and counseling at work I am able to better handle some of the hardships life has thrown my way.  That being said, it also has bothered my stomach  with diarrhea for the last 6 weeks or so.  Do you think I am OK to cut back to 1/2 a pill a day (50mg)?  If not, what would you suggest?    Thank you for your time.    Tommy

## 2021-10-04 NOTE — TELEPHONE ENCOUNTER
Please clarify how many days of sertraline 50mg before stopping? How many days of lexapro 5 before increasing to 10mg?

## 2021-10-04 NOTE — TELEPHONE ENCOUNTER
----- Message from LAURA Gutierrez sent at 9/30/2021  7:48 PM EDT -----  Regarding: FW: Prescription Question  Contact: [phone]      ----- Message -----  From: Jackie Valdez MA  Sent: 9/29/2021   1:39 PM EDT  To: LAURA Gutierrez  Subject: FW: Prescription Question                          ----- Message -----  From: Mika Gabriel  Sent: 9/29/2021   1:24 PM EDT  To: Harriett Severino [facility]  Subject: Prescription Question                            I am still taking 100mg of Zoloft and my general happiness and state of mind has been better.  Between that and counseling at work I am able to better handle some of the hardships life has thrown my way.  That being said, it also has bothered my stomach  with diarrhea for the last 6 weeks or so.  Do you think I am OK to cut back to 1/2 a pill a day (50mg)?  If not, what would you suggest?    Thank you for your time.    Tommy

## 2021-10-05 RX ORDER — ESCITALOPRAM OXALATE 10 MG/1
TABLET ORAL
Qty: 30 TABLET | Refills: 2 | Status: SHIPPED | OUTPATIENT
Start: 2021-10-05 | End: 2022-01-17

## 2021-11-15 RX ORDER — AMLODIPINE BESYLATE 10 MG/1
10 TABLET ORAL DAILY
Qty: 90 TABLET | Refills: 0 | Status: SHIPPED | OUTPATIENT
Start: 2021-11-15 | End: 2022-02-22

## 2021-12-20 ENCOUNTER — ANESTHESIA (OUTPATIENT)
Dept: GASTROENTEROLOGY | Facility: HOSPITAL | Age: 51
End: 2021-12-20

## 2021-12-20 ENCOUNTER — ANESTHESIA EVENT (OUTPATIENT)
Dept: GASTROENTEROLOGY | Facility: HOSPITAL | Age: 51
End: 2021-12-20

## 2021-12-20 ENCOUNTER — HOSPITAL ENCOUNTER (OUTPATIENT)
Facility: HOSPITAL | Age: 51
Setting detail: HOSPITAL OUTPATIENT SURGERY
Discharge: HOME | End: 2021-12-20
Attending: INTERNAL MEDICINE | Admitting: INTERNAL MEDICINE

## 2021-12-20 VITALS
HEIGHT: 71 IN | OXYGEN SATURATION: 97 % | WEIGHT: 315 LBS | RESPIRATION RATE: 14 BRPM | BODY MASS INDEX: 44.1 KG/M2 | DIASTOLIC BLOOD PRESSURE: 76 MMHG | HEART RATE: 65 BPM | SYSTOLIC BLOOD PRESSURE: 124 MMHG

## 2021-12-20 DIAGNOSIS — Z12.11 SCREEN FOR COLON CANCER: ICD-10-CM

## 2021-12-20 LAB — GLUCOSE BLDC GLUCOMTR-MCNC: 84 MG/DL (ref 70–130)

## 2021-12-20 PROCEDURE — 82962 GLUCOSE BLOOD TEST: CPT

## 2021-12-20 PROCEDURE — 45385 COLONOSCOPY W/LESION REMOVAL: CPT | Performed by: INTERNAL MEDICINE

## 2021-12-20 PROCEDURE — 25010000002 PROPOFOL 10 MG/ML EMULSION: Performed by: ANESTHESIOLOGY

## 2021-12-20 PROCEDURE — 88305 TISSUE EXAM BY PATHOLOGIST: CPT | Performed by: INTERNAL MEDICINE

## 2021-12-20 RX ORDER — LIDOCAINE HYDROCHLORIDE 20 MG/ML
INJECTION, SOLUTION INFILTRATION; PERINEURAL AS NEEDED
Status: DISCONTINUED | OUTPATIENT
Start: 2021-12-20 | End: 2021-12-20 | Stop reason: SURG

## 2021-12-20 RX ORDER — SODIUM CHLORIDE, SODIUM LACTATE, POTASSIUM CHLORIDE, CALCIUM CHLORIDE 600; 310; 30; 20 MG/100ML; MG/100ML; MG/100ML; MG/100ML
30 INJECTION, SOLUTION INTRAVENOUS CONTINUOUS PRN
Status: DISCONTINUED | OUTPATIENT
Start: 2021-12-20 | End: 2021-12-20 | Stop reason: HOSPADM

## 2021-12-20 RX ORDER — PROPOFOL 10 MG/ML
VIAL (ML) INTRAVENOUS AS NEEDED
Status: DISCONTINUED | OUTPATIENT
Start: 2021-12-20 | End: 2021-12-20 | Stop reason: SURG

## 2021-12-20 RX ORDER — PROPOFOL 10 MG/ML
VIAL (ML) INTRAVENOUS CONTINUOUS PRN
Status: DISCONTINUED | OUTPATIENT
Start: 2021-12-20 | End: 2021-12-20 | Stop reason: SURG

## 2021-12-20 RX ADMIN — LIDOCAINE HYDROCHLORIDE 60 MG: 20 INJECTION, SOLUTION INFILTRATION; PERINEURAL at 13:53

## 2021-12-20 RX ADMIN — Medication 200 MCG/KG/MIN: at 13:53

## 2021-12-20 RX ADMIN — SODIUM CHLORIDE, POTASSIUM CHLORIDE, SODIUM LACTATE AND CALCIUM CHLORIDE 30 ML/HR: 600; 310; 30; 20 INJECTION, SOLUTION INTRAVENOUS at 13:06

## 2021-12-20 RX ADMIN — PROPOFOL 200 MG: 10 INJECTION, EMULSION INTRAVENOUS at 13:53

## 2021-12-20 NOTE — ANESTHESIA PREPROCEDURE EVALUATION
Anesthesia Evaluation     Patient summary reviewed and Nursing notes reviewed                Airway   Mallampati: III  TM distance: <3 FB  Neck ROM: full  Possible difficult intubation and Large neck circumference  Dental - normal exam     Pulmonary - normal exam   Cardiovascular - normal exam    ECG reviewed    (+) hypertension 2 medications or greater, hyperlipidemia,       Neuro/Psych  (+) psychiatric history Anxiety and Depression,     GI/Hepatic/Renal/Endo    (+) obesity, morbid obesity, hiatal hernia, GERD,  diabetes mellitus type 2,     Musculoskeletal     Abdominal   (+) obese,    Substance History      OB/GYN          Other      history of cancer      Other Comment: Thyroid CA                Anesthesia Plan    ASA 3     MAC     intravenous induction     Anesthetic plan, all risks, benefits, and alternatives have been provided, discussed and informed consent has been obtained with: patient.

## 2021-12-20 NOTE — ANESTHESIA POSTPROCEDURE EVALUATION
Patient: Mika Gabriel    Procedure Summary     Date: 12/20/21 Room / Location: [facility] ENDOSCOPY 10 /  [facility]    Anesthesia Start: 1347 Anesthesia Stop: 1428    Procedure: COLONOSCOPY to cecum with hot snare polypectomy (N/A ) Diagnosis:       Screen for colon cancer      (Screen for colon cancer [Z12.11])    Surgeons: Steve Pickett MD Provider: Garfield Horn MD    Anesthesia Type: MAC ASA Status: 3          Anesthesia Type: MAC    Vitals  Vitals Value Taken Time   /66 12/20/21 1427   Temp     Pulse 77 12/20/21 1427   Resp 14 12/20/21 1427   SpO2 98 % 12/20/21 1427           Post Anesthesia Care and Evaluation    Patient location during evaluation: PHASE II  Patient participation: complete - patient participated  Level of consciousness: awake and alert  Pain management: adequate  Airway patency: patent  Anesthetic complications: No anesthetic complications  PONV Status: none  Cardiovascular status: acceptable and hemodynamically stable  Respiratory status: acceptable, nonlabored ventilation and spontaneous ventilation  Hydration status: acceptable

## 2021-12-21 LAB
LAB AP CASE REPORT: NORMAL
LAB AP CLINICAL INFORMATION: NORMAL
PATH REPORT.FINAL DX SPEC: NORMAL
PATH REPORT.GROSS SPEC: NORMAL

## 2021-12-21 NOTE — H&P
"[facility]  Pre Procedure History & Physical    Chief Complaint:   Time for my colonoscopy    Subjective     HPI:   51 y.o. male [employer]  here for a screening colonoscopy.    Past Medical History:   Past Medical History:   Diagnosis Date   • Diabetes mellitus (HCC)    • Disease of thyroid gland    • Hyperlipidemia    • Hypertension    • Pneumonia    • thyroid        Family History:  Family History   Problem Relation Age of Onset   • Pancreatic cancer Sister    • Liver cancer Brother    • Thyroid cancer Brother        Social History:   reports that he has never smoked. He has never used smokeless tobacco. He reports current alcohol use. He reports that he does not use drugs.    Medications:   No medications prior to admission.       Allergies:  Penicillins    ROS:    Pertinent items are noted in HPI     Objective     Blood pressure 124/76, pulse 65, resp. rate 14, height 180.3 cm (71\"), weight (!) 146 kg (322 lb 1.6 oz), SpO2 97 %.    Physical Exam   Constitutional: Pt is oriented to person, place, and time and well-developed, well-nourished, and in no distress.   HENT:   Mouth/Throat: Oropharynx is clear and moist.   Neck: Normal range of motion. Neck supple.   Cardiovascular: Normal rate, regular rhythm and normal heart sounds.    Pulmonary/Chest: Effort normal and breath sounds normal. No respiratory distress. No  wheezes.   Abdominal: Soft. Bowel sounds are normal.   Skin: Skin is warm and dry.   Psychiatric: Mood, memory, affect and judgment normal.     Assessment/Plan     Diagnosis:  51 y.o. male [employer]  here for a screening colonoscopy.    Anticipated Surgical Procedure:  Colonoscopy    The risks, benefits, and alternatives of this procedure have been discussed with the patient or the responsible party- the patient understands and agrees to proceed.    Steve Pickett M.D.  "

## 2021-12-24 NOTE — PROGRESS NOTES
12/24/21       Tell him that the colon polyp that was removed was not cancerous and usually isn't precancerous. Because this polyp was larger and on the right side of the colon, I would recommend that we do a repeat colonoscopy in one year to make sure that all of that polyp was completely removed.        Send this report to his PCP.   Hadley auguste

## 2022-01-03 ENCOUNTER — TELEPHONE (OUTPATIENT)
Dept: GASTROENTEROLOGY | Facility: CLINIC | Age: 52
End: 2022-01-03

## 2022-01-03 NOTE — TELEPHONE ENCOUNTER
VM to pt with request to contact office.     C/s for 12/20/22 placed in recall and HM.      Update to LAURA Meeks.

## 2022-01-03 NOTE — TELEPHONE ENCOUNTER
----- Message from Steve Pickett MD sent at 12/24/2021  8:45 AM EST -----  12/24/21       Tell him that the colon polyp that was removed was not cancerous and usually isn't precancerous. Because this polyp was larger and on the right side of the colon, I would recommend that we do a repeat colonoscopy in one year to make sure that all of that polyp was completely removed.        Send this report to his PCP.   Hadley auguste

## 2022-01-15 DIAGNOSIS — I10 HYPERTENSION, UNSPECIFIED TYPE: ICD-10-CM

## 2022-01-17 RX ORDER — PROPRANOLOL HCL 60 MG
60 CAPSULE, EXTENDED RELEASE 24HR ORAL DAILY
Qty: 90 CAPSULE | Refills: 1 | Status: SHIPPED | OUTPATIENT
Start: 2022-01-17 | End: 2022-08-02

## 2022-01-17 RX ORDER — ESCITALOPRAM OXALATE 10 MG/1
10 TABLET ORAL EVERY MORNING
Qty: 90 TABLET | Refills: 0 | Status: SHIPPED | OUTPATIENT
Start: 2022-01-17 | End: 2022-04-18

## 2022-02-22 RX ORDER — AMLODIPINE BESYLATE 10 MG/1
10 TABLET ORAL DAILY
Qty: 90 TABLET | Refills: 0 | Status: SHIPPED | OUTPATIENT
Start: 2022-02-22

## 2022-04-18 RX ORDER — ESCITALOPRAM OXALATE 10 MG/1
10 TABLET ORAL EVERY MORNING
Qty: 30 TABLET | Refills: 0 | Status: SHIPPED | OUTPATIENT
Start: 2022-04-18 | End: 2022-05-29

## 2022-05-06 DIAGNOSIS — I10 HYPERTENSION, UNSPECIFIED TYPE: ICD-10-CM

## 2022-05-06 RX ORDER — PROPRANOLOL HCL 60 MG
60 CAPSULE, EXTENDED RELEASE 24HR ORAL DAILY
Qty: 90 CAPSULE | Refills: 1 | OUTPATIENT
Start: 2022-05-06

## 2022-05-06 RX ORDER — AMLODIPINE BESYLATE 10 MG/1
10 TABLET ORAL DAILY
Qty: 90 TABLET | Refills: 0 | OUTPATIENT
Start: 2022-05-06

## 2022-05-29 RX ORDER — ESCITALOPRAM OXALATE 10 MG/1
10 TABLET ORAL EVERY MORNING
Qty: 30 TABLET | Refills: 0 | Status: SHIPPED | OUTPATIENT
Start: 2022-05-29 | End: 2022-06-26

## 2022-06-26 RX ORDER — ESCITALOPRAM OXALATE 10 MG/1
10 TABLET ORAL EVERY MORNING
Qty: 30 TABLET | Refills: 0 | Status: SHIPPED | OUTPATIENT
Start: 2022-06-26 | End: 2022-07-27

## 2022-07-06 ENCOUNTER — OFFICE VISIT (OUTPATIENT)
Dept: FAMILY MEDICINE CLINIC | Facility: CLINIC | Age: 52
End: 2022-07-06

## 2022-07-06 VITALS
WEIGHT: 315 LBS | BODY MASS INDEX: 44.1 KG/M2 | OXYGEN SATURATION: 96 % | DIASTOLIC BLOOD PRESSURE: 60 MMHG | RESPIRATION RATE: 20 BRPM | SYSTOLIC BLOOD PRESSURE: 128 MMHG | HEART RATE: 66 BPM | TEMPERATURE: 96.6 F | HEIGHT: 71 IN

## 2022-07-06 DIAGNOSIS — K21.00 GASTROESOPHAGEAL REFLUX DISEASE WITH ESOPHAGITIS WITHOUT HEMORRHAGE: ICD-10-CM

## 2022-07-06 DIAGNOSIS — Z00.00 HEALTH CARE MAINTENANCE: ICD-10-CM

## 2022-07-06 DIAGNOSIS — F43.21 COMPLICATED GRIEF: ICD-10-CM

## 2022-07-06 DIAGNOSIS — C73: Primary | ICD-10-CM

## 2022-07-06 PROBLEM — Z87.442 PERSONAL HISTORY OF KIDNEY STONES: Status: ACTIVE | Noted: 2022-07-06

## 2022-07-06 PROBLEM — G47.30 SLEEP APNEA: Status: ACTIVE | Noted: 2020-09-09

## 2022-07-06 PROBLEM — Z87.11 HISTORY OF GASTRIC ULCER: Status: ACTIVE | Noted: 2021-01-27

## 2022-07-06 PROBLEM — E89.0 POSTOPERATIVE HYPOTHYROIDISM: Status: ACTIVE | Noted: 2021-11-18

## 2022-07-06 PROBLEM — Z85.89 HISTORY OF MALIGNANT NEOPLASM METASTATIC TO LUNG: Status: ACTIVE | Noted: 2021-01-27

## 2022-07-06 PROBLEM — G54.8 INTERCOSTAL NEUROPATHIC PAIN: Status: ACTIVE | Noted: 2020-12-02

## 2022-07-06 PROCEDURE — 99396 PREV VISIT EST AGE 40-64: CPT | Performed by: NURSE PRACTITIONER

## 2022-07-06 RX ORDER — OMEPRAZOLE 40 MG/1
40 CAPSULE, DELAYED RELEASE ORAL DAILY
Qty: 90 CAPSULE | Refills: 0 | Status: SHIPPED | OUTPATIENT
Start: 2022-07-06

## 2022-07-06 NOTE — PROGRESS NOTES
"Chief Complaint  Annual Exam, Back Pain, Hypothyroidism, Hypertension, and Hyperlipidemia    Subjective        Mika Gabriel presents to [facility]  History of Present Illness  CT chest is stable, but thyroglobulin is rising. Has US neck planned. Under care of Dr. Hodges, lipids stable. Recently gained weight and increased low back pain and knee pain.   Sister just passed from colon cancer 6 weeks, brother colon cancer as well. They bought her home. They just moved into her home. Still feels like her house.walking for self care. No support group. No td tradition. Not a lot of uriel. No SI or HI    Lot of reflux with weight gain. Has lap band. Food getting stuck. [place] did lap band, colonoscopy 2021, repeat 2 yrs. Hyperplastic polyp  No FH prostate cancer    Nocturia x1 nightly for several years. Drinking a lot of water. No weak stream or difficulty emptying bladder.     Objective   Vital Signs:  /60   Pulse 66   Temp 96.6 °F (35.9 °C) (Temporal)   Resp 20   Ht 180.3 cm (71\")   Wt (!) 147 kg (323 lb)   SpO2 96%   BMI 45.05 kg/m²   Estimated body mass index is 45.05 kg/m² as calculated from the following:    Height as of this encounter: 180.3 cm (71\").    Weight as of this encounter: 147 kg (323 lb).    Class 3 Severe Obesity (BMI >=40). Obesity-related health conditions include the following: obstructive sleep apnea, hypertension, diabetes mellitus, GERD and osteoarthritis. Obesity is worsening. BMI is is above average; BMI management plan is completed. We discussed portion control, increasing exercise, joining a fitness center or start home based exercise program, pharmacologic options including increased ozempic through Dr Hodges and increased social supports. consider cancer support groups.      Physical Exam  Vitals and nursing note reviewed.   Constitutional:       Appearance: He is well-developed. He is obese. He is not diaphoretic.   HENT:      Head: Normocephalic " and atraumatic.   Neck:      Thyroid: No thyromegaly.   Cardiovascular:      Rate and Rhythm: Normal rate and regular rhythm.      Pulses:           Carotid pulses are 2+ on the right side and 2+ on the left side.     Heart sounds: Normal heart sounds.   Pulmonary:      Effort: Pulmonary effort is normal.      Breath sounds: Normal breath sounds.   Musculoskeletal:      Cervical back: Normal range of motion and neck supple.   Lymphadenopathy:      Cervical: No cervical adenopathy.   Psychiatric:         Mood and Affect: Mood is depressed.         Speech: Speech normal.         Behavior: Behavior normal.         Thought Content: Thought content normal.         Judgment: Judgment normal.        Result Review :                Assessment and Plan {CC Problem List  Visit Diagnosis   ROS  Review (Popup)  Health Maintenance  Quality  BestPractice  Medications  SmartSets  SnapShot Encounters  Media :23}  Diagnoses and all orders for this visit:    1. Local recurrence of malignant neoplasm of thyroid gland (HCC) (Primary)    2. Health care maintenance  -     PSA DIAGNOSTIC ONLY    3. Complicated grief    4. Gastroesophageal reflux disease with esophagitis without hemorrhage    Other orders  -     omeprazole (priLOSEC) 40 MG capsule; Take 1 capsule by mouth Daily.  Dispense: 90 capsule; Refill: 0    Papillary thyroid cancer with metastasis to the lungs- follows with oncology and Dr. Hodges.  Really does not have a good support system outside his family to help him deal with implications.  He does not feel its fair to place any burden on wife and children.  Encouraged him to look into cancer support groups.  Screen for prostate cancer--check PSA  Complicated grief- he has lost multiple family members in the last year to cancer.  Needs nonjudgmental support outside his family.  GERD- on recent CT some esophageal thickening.  Discussed.  He has off his PPI, would restart.  If symptoms not improved in 6 to 8 weeks,  would consider upper scope.  Colonoscopy is up-to-date       Follow Up   No follow-ups on file.  Patient was given instructions and counseling regarding his condition or for health maintenance advice. Please see specific information pulled into the AVS if appropriate.     Follow-up 6 months

## 2022-07-07 LAB — PSA SERPL-MCNC: 0.3 NG/ML (ref 0–4)

## 2022-07-27 RX ORDER — ESCITALOPRAM OXALATE 10 MG/1
10 TABLET ORAL EVERY MORNING
Qty: 30 TABLET | Refills: 0 | Status: SHIPPED | OUTPATIENT
Start: 2022-07-27 | End: 2022-08-26

## 2022-08-01 DIAGNOSIS — I10 HYPERTENSION, UNSPECIFIED TYPE: ICD-10-CM

## 2022-08-02 RX ORDER — PROPRANOLOL HCL 60 MG
60 CAPSULE, EXTENDED RELEASE 24HR ORAL DAILY
Qty: 90 CAPSULE | Refills: 1 | Status: SHIPPED | OUTPATIENT
Start: 2022-08-02

## 2022-08-26 RX ORDER — ESCITALOPRAM OXALATE 10 MG/1
10 TABLET ORAL EVERY MORNING
Qty: 30 TABLET | Refills: 3 | Status: SHIPPED | OUTPATIENT
Start: 2022-08-26 | End: 2022-12-26

## 2022-11-30 ENCOUNTER — DOCUMENTATION (OUTPATIENT)
Dept: GASTROENTEROLOGY | Facility: CLINIC | Age: 52
End: 2022-11-30

## 2022-12-26 RX ORDER — ESCITALOPRAM OXALATE 10 MG/1
10 TABLET ORAL EVERY MORNING
Qty: 30 TABLET | Refills: 3 | Status: SHIPPED | OUTPATIENT
Start: 2022-12-26

## 2023-01-23 DIAGNOSIS — I10 HYPERTENSION, UNSPECIFIED TYPE: ICD-10-CM

## 2023-01-23 RX ORDER — PROPRANOLOL HCL 60 MG
60 CAPSULE, EXTENDED RELEASE 24HR ORAL DAILY
Qty: 90 CAPSULE | Refills: 1 | OUTPATIENT
Start: 2023-01-23

## 2023-03-24 RX ORDER — ESCITALOPRAM OXALATE 10 MG/1
10 TABLET ORAL EVERY MORNING
Qty: 30 TABLET | Refills: 3 | OUTPATIENT
Start: 2023-03-24

## 2023-05-08 ENCOUNTER — TELEPHONE (OUTPATIENT)
Dept: GASTROENTEROLOGY | Facility: CLINIC | Age: 53
End: 2023-05-08

## 2023-05-08 NOTE — TELEPHONE ENCOUNTER
"  Hub staff attempted to follow warm transfer process and was unsuccessful     Caller: Mika Gabriel \"RODERICK\"    Relationship to patient: Self    Best call back number: [phone]    Patient is needing: PT CALLING TO SCHEDULE COLONOSCOPY. PLEASE CALL PT BACK, OKAY TO LEAVE VM. WARM TRANSFER UNSUCCESSFUL.          "

## 2023-05-10 ENCOUNTER — TELEPHONE (OUTPATIENT)
Dept: GASTROENTEROLOGY | Facility: CLINIC | Age: 53
End: 2023-05-10

## 2023-05-10 ENCOUNTER — PRE-PROCEDURE SCREENING (OUTPATIENT)
Dept: GASTROENTEROLOGY | Facility: CLINIC | Age: 53
End: 2023-05-10
Payer: COMMERCIAL

## 2023-05-10 NOTE — TELEPHONE ENCOUNTER
"    Caller: Mika Gabriel \"RODERICK\"    Relationship to patient: Self    Best call back number: [phone]    Chief complaint: PT WANTS TO SCHEDULE COLONOSCOPY     Type of visit: COLONOSCOPY    Requested date: FIRST AVAILABLE     "

## 2023-05-11 NOTE — TELEPHONE ENCOUNTER
LAST SCOPE 12/21 IN Epic --Personal history of polyps--No family  history  of polyps or   colon cancer--No blood thinners--Medications:          amLODIPine (NORVASC) 10 MG tablet  buPROPion XL (WELLBUTRIN XL) 300 MG 24 hr tablet  escitalopram (LEXAPRO) 10 MG tablet  levothyroxine (SYNTHROID, LEVOTHROID) 175 MCG tablet  losartan (COZAAR) 100 MG tablet  omeprazole (priLOSEC) 40 MG capsule  propranolol LA (INDERAL LA) 60 MG 24 hr capsule  rosuvastatin (CRESTOR) 40 MG tablet             QUESTIONNAIRE SCREENING   HAS BEEN SENT TO DOCTOR FOR REVIEW

## 2023-05-29 ENCOUNTER — PREP FOR SURGERY (OUTPATIENT)
Dept: OTHER | Facility: HOSPITAL | Age: 53
End: 2023-05-29

## 2023-05-29 DIAGNOSIS — K63.5 COLON POLYP: Primary | ICD-10-CM

## 2023-06-05 ENCOUNTER — TELEPHONE (OUTPATIENT)
Dept: GASTROENTEROLOGY | Facility: CLINIC | Age: 53
End: 2023-06-05

## 2023-06-06 ENCOUNTER — TELEPHONE (OUTPATIENT)
Dept: GASTROENTEROLOGY | Facility: CLINIC | Age: 53
End: 2023-06-06
Payer: COMMERCIAL

## 2023-06-06 NOTE — TELEPHONE ENCOUNTER
"    Caller: Mika Gabriel \"RODERICK\"    Relationship to patient: Self    Best call back number: [phone]    Patient is needing: PATIENT CALLED IN, HE STATED HE'S BEEN TRYING TO GET COLONOSCOPY SCHEDULED FOR A WHILE NOW AND WOULD LIKE A CALL BACK ASAP PLEASE. IF YOU CAN'T REACH HIM YOU CAN LEAVE A DETAILED MESSAGE IF NEED BE.          "
"Hub staff attempted to follow warm transfer process and was unsuccessful     Caller: Mika Gabriel \"RODERICK\"    Relationship to patient: Self    Best call back number: [phone]     Patient is needing:   PATIENT MISSED CALL TO SCHEDULE SCOPE.  PLEASE CALL BACK AS SOON AS POSSIBLE.  OK TO LEAVE VM.          "
Patent

## 2023-06-07 ENCOUNTER — TELEPHONE (OUTPATIENT)
Dept: GASTROENTEROLOGY | Facility: CLINIC | Age: 53
End: 2023-06-07
Payer: COMMERCIAL

## 2023-07-31 ENCOUNTER — TELEPHONE (OUTPATIENT)
Dept: GASTROENTEROLOGY | Facility: CLINIC | Age: 53
End: 2023-07-31

## 2023-07-31 NOTE — TELEPHONE ENCOUNTER
Hub staff attempted to follow warm transfer process and was unsuccessful     Caller: PT    Relationship to patient:     Best call back number: [phone]  & WORK# [phone]    Patient is needing: TO SCHEDULE COLONOSCOPY. PT IS ACTIVE IN [employer]. CAN MESSAGE THROUGH THERE ALSO.

## 2023-08-01 ENCOUNTER — TELEPHONE (OUTPATIENT)
Dept: GASTROENTEROLOGY | Facility: CLINIC | Age: 53
End: 2023-08-01
Payer: COMMERCIAL

## 2023-08-02 ENCOUNTER — TELEPHONE (OUTPATIENT)
Dept: GASTROENTEROLOGY | Facility: CLINIC | Age: 53
End: 2023-08-02
Payer: COMMERCIAL

## 2023-09-12 ENCOUNTER — PRE-ADMISSION TESTING (OUTPATIENT)
Dept: PREADMISSION TESTING | Facility: HOSPITAL | Age: 53
End: 2023-09-12
Payer: COMMERCIAL

## 2023-09-12 VITALS
WEIGHT: 248 LBS | TEMPERATURE: 98.3 F | DIASTOLIC BLOOD PRESSURE: 77 MMHG | BODY MASS INDEX: 35.5 KG/M2 | OXYGEN SATURATION: 98 % | HEART RATE: 73 BPM | HEIGHT: 70 IN | RESPIRATION RATE: 16 BRPM | SYSTOLIC BLOOD PRESSURE: 138 MMHG

## 2023-09-12 LAB
ANION GAP SERPL CALCULATED.3IONS-SCNC: 11.2 MMOL/L (ref 5–15)
BUN SERPL-MCNC: 13 MG/DL (ref 6–20)
BUN/CREAT SERPL: 14 (ref 7–25)
CALCIUM SPEC-SCNC: 10.3 MG/DL (ref 8.6–10.5)
CHLORIDE SERPL-SCNC: 103 MMOL/L (ref 98–107)
CO2 SERPL-SCNC: 26.8 MMOL/L (ref 22–29)
CREAT SERPL-MCNC: 0.93 MG/DL (ref 0.76–1.27)
DEPRECATED RDW RBC AUTO: 44.5 FL (ref 37–54)
EGFRCR SERPLBLD CKD-EPI 2021: 99.4 ML/MIN/1.73
ERYTHROCYTE [DISTWIDTH] IN BLOOD BY AUTOMATED COUNT: 16.3 % (ref 12.3–15.4)
GLUCOSE SERPL-MCNC: 87 MG/DL (ref 65–99)
HCT VFR BLD AUTO: 41.9 % (ref 37.5–51)
HGB BLD-MCNC: 14 G/DL (ref 13–17.7)
MCH RBC QN AUTO: 25.7 PG (ref 26.6–33)
MCHC RBC AUTO-ENTMCNC: 33.4 G/DL (ref 31.5–35.7)
MCV RBC AUTO: 76.9 FL (ref 79–97)
PLATELET # BLD AUTO: 188 10*3/MM3 (ref 140–450)
PMV BLD AUTO: 9.5 FL (ref 6–12)
POTASSIUM SERPL-SCNC: 3.9 MMOL/L (ref 3.5–5.2)
QT INTERVAL: 396 MS
QTC INTERVAL: 415 MS
RBC # BLD AUTO: 5.45 10*6/MM3 (ref 4.14–5.8)
SODIUM SERPL-SCNC: 141 MMOL/L (ref 136–145)
WBC NRBC COR # BLD: 7.78 10*3/MM3 (ref 3.4–10.8)

## 2023-09-12 PROCEDURE — 85027 COMPLETE CBC AUTOMATED: CPT

## 2023-09-12 PROCEDURE — 36415 COLL VENOUS BLD VENIPUNCTURE: CPT

## 2023-09-12 PROCEDURE — 93005 ELECTROCARDIOGRAM TRACING: CPT

## 2023-09-12 PROCEDURE — 80048 BASIC METABOLIC PNL TOTAL CA: CPT

## 2023-09-12 RX ORDER — LOSARTAN POTASSIUM AND HYDROCHLOROTHIAZIDE 25; 100 MG/1; MG/1
1 TABLET ORAL DAILY
COMMUNITY
Start: 2023-02-22

## 2023-09-12 RX ORDER — LANOLIN ALCOHOL/MO/W.PET/CERES
325 CREAM (GRAM) TOPICAL
COMMUNITY
Start: 2023-08-29 | End: 2024-08-28

## 2023-09-12 RX ORDER — SEMAGLUTIDE 2.68 MG/ML
2 INJECTION, SOLUTION SUBCUTANEOUS WEEKLY
COMMUNITY

## 2023-09-12 NOTE — DISCHARGE INSTRUCTIONS
Take the following medications the morning of surgery:  AMLODIPINE,WELLBUTRIN,LEVOTHYROXINE,OMEPRAZOLE AND PROPRANOLOL    YOU WILL BE NOTIFIED OF ARRIVAL TIME    If you are on prescription narcotic pain medication to control your pain you may also take that medication the morning of surgery.    General Instructions:  Do not eat solid food after midnight the night before surgery.  You may drink clear liquids day of surgery but must stop at least one hour before your hospital arrival time.  It is beneficial for you to have a clear drink that contains carbohydrates the day of surgery.  We suggest a 12 to 20 ounce bottle of Gatorade or Powerade for non-diabetic patients or a 12 to 20 ounce bottle of G2 or Powerade Zero for diabetic patients. (Pediatric patients, are not advised to drink a 12 to 20 ounce carbohydrate drink)    Clear liquids are liquids you can see through.  Nothing red in color.     Plain water                               Sports drinks  Sodas                                   Gelatin (Jell-O)  Fruit juices without pulp such as white grape juice and apple juice  Popsicles that contain no fruit or yogurt  Tea or coffee (no cream or milk added)  Gatorade / Powerade  G2 / Powerade Zero    Infants may have breast milk up to four hours before surgery.  Infants drinking formula may drink formula up to six hours before surgery.   Patients who avoid smoking, chewing tobacco and alcohol for 4 weeks prior to surgery have a reduced risk of post-operative complications.  Quit smoking as many days before surgery as you can.  Do not smoke, use chewing tobacco or drink alcohol the day of surgery.   If applicable bring your C-PAP/ BI-PAP machine in with you to preop day of surgery.  Bring any papers given to you in the doctor’s office.  Wear clean comfortable clothes.  Do not wear contact lenses, false eyelashes or make-up.  Bring a case for your glasses.   Bring crutches or walker if applicable.  Remove all piercings.   Leave jewelry and any other valuables at home.  Hair extensions with metal clips must be removed prior to surgery.  The Pre-Admission Testing nurse will instruct you to bring medications if unable to obtain an accurate list in Pre-Admission Testing.        If you were given a blood bank ID arm band remember to bring it with you the day of surgery.    Preventing a Surgical Site Infection:  For 2 to 3 days before surgery, avoid shaving with a razor because the razor can irritate skin and make it easier to develop an infection.    Any areas of open skin can increase the risk of a post-operative wound infection by allowing bacteria to enter and travel throughout the body.  Notify your surgeon if you have any skin wounds / rashes even if it is not near the expected surgical site.  The area will need assessed to determine if surgery should be delayed until it is healed.  The night prior to surgery shower using a fresh bar of anti-bacterial soap (such as Dial) and clean washcloth.  Sleep in a clean bed with clean clothing.  Do not allow pets to sleep with you.  Shower on the morning of surgery using a fresh bar of anti-bacterial soap (such as Dial) and clean washcloth.  Dry with a clean towel and dress in clean clothing.  Ask your surgeon if you will be receiving antibiotics prior to surgery.  Make sure you, your family, and all healthcare providers clean their hands with soap and water or an alcohol based hand  before caring for you or your wound.    Day of surgery:  9/18/2023  Your arrival time is approximately two hours before your scheduled surgery time.  Upon arrival, a Pre-op nurse and Anesthesiologist will review your health history, obtain vital signs, and answer questions you may have.  The only belongings needed at this time will be a list of your home medications and if applicable your C-PAP/BI-PAP machine.  A Pre-op nurse will start an IV and you may receive medication in preparation for surgery,  including something to help you relax.     Please be aware that surgery does come with discomfort.  We want to make every effort to control your discomfort so please discuss any uncontrolled symptoms with your nurse.   Your doctor will most likely have prescribed pain medications.      If you are going home after surgery you will receive individualized written care instructions before being discharged.  A responsible adult must drive you to and from the hospital on the day of your surgery and stay with you for 24 hours.  Discharge prescriptions can be filled by the hospital pharmacy during regular pharmacy hours.  If you are having surgery late in the day/evening your prescription may be e-prescribed to your pharmacy.  Please verify your pharmacy hours or chose a 24 hour pharmacy to avoid not having access to your prescription because your pharmacy has closed for the day.    If you are staying overnight following surgery, you will be transported to your hospital room following the recovery period.  [facility] has all private rooms.    If you have any questions please call Pre-Admission Testing at [phone].  Deductibles and co-payments are collected on the day of service. Please be prepared to pay the required co-pay, deductible or deposit on the day of service as defined by your plan.    Call your surgeon immediately if you experience any of the following symptoms:  Sore Throat  Shortness of Breath or difficulty breathing  Cough  Chills  Body soreness or muscle pain  Headache  Fever  New loss of taste or smell  Do not arrive for your surgery ill.  Your procedure will need to be rescheduled to another time.  You will need to call your physician before the day of surgery to avoid any unnecessary exposure to hospital staff as well as other patients.

## 2023-09-18 ENCOUNTER — ANESTHESIA EVENT (OUTPATIENT)
Dept: PERIOP | Facility: HOSPITAL | Age: 53
End: 2023-09-18
Payer: COMMERCIAL

## 2023-09-18 ENCOUNTER — ANESTHESIA (OUTPATIENT)
Dept: PERIOP | Facility: HOSPITAL | Age: 53
End: 2023-09-18
Payer: COMMERCIAL

## 2023-09-18 ENCOUNTER — APPOINTMENT (OUTPATIENT)
Dept: GENERAL RADIOLOGY | Facility: HOSPITAL | Age: 53
End: 2023-09-18

## 2023-09-18 ENCOUNTER — HOSPITAL ENCOUNTER (OUTPATIENT)
Facility: HOSPITAL | Age: 53
Setting detail: HOSPITAL OUTPATIENT SURGERY
Discharge: HOME | End: 2023-09-18
Attending: ORTHOPAEDIC SURGERY | Admitting: ORTHOPAEDIC SURGERY

## 2023-09-18 VITALS
RESPIRATION RATE: 16 BRPM | SYSTOLIC BLOOD PRESSURE: 120 MMHG | OXYGEN SATURATION: 99 % | TEMPERATURE: 98.2 F | HEART RATE: 66 BPM | DIASTOLIC BLOOD PRESSURE: 71 MMHG

## 2023-09-18 DIAGNOSIS — M20.5X1 MALLET TOE, ACQUIRED, RIGHT: Primary | ICD-10-CM

## 2023-09-18 DIAGNOSIS — R52 PAIN: ICD-10-CM

## 2023-09-18 LAB
GLUCOSE BLDC GLUCOMTR-MCNC: 91 MG/DL (ref 70–130)
GLUCOSE BLDC GLUCOMTR-MCNC: 99 MG/DL (ref 70–130)

## 2023-09-18 PROCEDURE — 73620 X-RAY EXAM OF FOOT: CPT

## 2023-09-18 PROCEDURE — 25010000002 SUGAMMADEX 200 MG/2ML SOLUTION: Performed by: NURSE ANESTHETIST, CERTIFIED REGISTERED

## 2023-09-18 PROCEDURE — 76000 FLUOROSCOPY <1 HR PHYS/QHP: CPT

## 2023-09-18 PROCEDURE — 25010000002 CEFAZOLIN IN DEXTROSE 2-4 GM/100ML-% SOLUTION: Performed by: ORTHOPAEDIC SURGERY

## 2023-09-18 PROCEDURE — 25010000002 PROPOFOL 10 MG/ML EMULSION: Performed by: NURSE ANESTHETIST, CERTIFIED REGISTERED

## 2023-09-18 PROCEDURE — 25010000002 DEXAMETHASONE SODIUM PHOSPHATE 20 MG/5ML SOLUTION: Performed by: NURSE ANESTHETIST, CERTIFIED REGISTERED

## 2023-09-18 PROCEDURE — 25010000002 BUPIVACAINE (PF) 0.5 % SOLUTION: Performed by: ORTHOPAEDIC SURGERY

## 2023-09-18 PROCEDURE — 25010000002 ONDANSETRON PER 1 MG: Performed by: NURSE ANESTHETIST, CERTIFIED REGISTERED

## 2023-09-18 PROCEDURE — 25010000002 HYDROMORPHONE PER 4 MG: Performed by: ANESTHESIOLOGY

## 2023-09-18 PROCEDURE — 82948 REAGENT STRIP/BLOOD GLUCOSE: CPT

## 2023-09-18 PROCEDURE — 25010000002 MIDAZOLAM PER 1 MG: Performed by: ANESTHESIOLOGY

## 2023-09-18 PROCEDURE — 88304 TISSUE EXAM BY PATHOLOGIST: CPT | Performed by: ORTHOPAEDIC SURGERY

## 2023-09-18 RX ORDER — LABETALOL HYDROCHLORIDE 5 MG/ML
5 INJECTION, SOLUTION INTRAVENOUS
Status: DISCONTINUED | OUTPATIENT
Start: 2023-09-18 | End: 2023-09-18 | Stop reason: HOSPADM

## 2023-09-18 RX ORDER — HYDROMORPHONE HYDROCHLORIDE 1 MG/ML
0.5 INJECTION, SOLUTION INTRAMUSCULAR; INTRAVENOUS; SUBCUTANEOUS
Status: DISCONTINUED | OUTPATIENT
Start: 2023-09-18 | End: 2023-09-18 | Stop reason: HOSPADM

## 2023-09-18 RX ORDER — SODIUM CHLORIDE, SODIUM LACTATE, POTASSIUM CHLORIDE, CALCIUM CHLORIDE 600; 310; 30; 20 MG/100ML; MG/100ML; MG/100ML; MG/100ML
9 INJECTION, SOLUTION INTRAVENOUS CONTINUOUS
Status: DISCONTINUED | OUTPATIENT
Start: 2023-09-18 | End: 2023-09-18 | Stop reason: HOSPADM

## 2023-09-18 RX ORDER — FENTANYL CITRATE 50 UG/ML
50 INJECTION, SOLUTION INTRAMUSCULAR; INTRAVENOUS
Status: DISCONTINUED | OUTPATIENT
Start: 2023-09-18 | End: 2023-09-18 | Stop reason: HOSPADM

## 2023-09-18 RX ORDER — PROMETHAZINE HYDROCHLORIDE 25 MG/1
25 SUPPOSITORY RECTAL ONCE AS NEEDED
Status: DISCONTINUED | OUTPATIENT
Start: 2023-09-18 | End: 2023-09-18 | Stop reason: HOSPADM

## 2023-09-18 RX ORDER — SODIUM CHLORIDE 0.9 % (FLUSH) 0.9 %
3 SYRINGE (ML) INJECTION EVERY 12 HOURS SCHEDULED
Status: DISCONTINUED | OUTPATIENT
Start: 2023-09-18 | End: 2023-09-18 | Stop reason: HOSPADM

## 2023-09-18 RX ORDER — ONDANSETRON 2 MG/ML
INJECTION INTRAMUSCULAR; INTRAVENOUS AS NEEDED
Status: DISCONTINUED | OUTPATIENT
Start: 2023-09-18 | End: 2023-09-18 | Stop reason: SURG

## 2023-09-18 RX ORDER — HYDRALAZINE HYDROCHLORIDE 20 MG/ML
5 INJECTION INTRAMUSCULAR; INTRAVENOUS
Status: DISCONTINUED | OUTPATIENT
Start: 2023-09-18 | End: 2023-09-18 | Stop reason: HOSPADM

## 2023-09-18 RX ORDER — DROPERIDOL 2.5 MG/ML
0.62 INJECTION, SOLUTION INTRAMUSCULAR; INTRAVENOUS
Status: DISCONTINUED | OUTPATIENT
Start: 2023-09-18 | End: 2023-09-18 | Stop reason: HOSPADM

## 2023-09-18 RX ORDER — ONDANSETRON 2 MG/ML
4 INJECTION INTRAMUSCULAR; INTRAVENOUS ONCE AS NEEDED
Status: DISCONTINUED | OUTPATIENT
Start: 2023-09-18 | End: 2023-09-18 | Stop reason: HOSPADM

## 2023-09-18 RX ORDER — HYDROCODONE BITARTRATE AND ACETAMINOPHEN 7.5; 325 MG/1; MG/1
1 TABLET ORAL ONCE AS NEEDED
Status: COMPLETED | OUTPATIENT
Start: 2023-09-18 | End: 2023-09-18

## 2023-09-18 RX ORDER — LIDOCAINE HYDROCHLORIDE 10 MG/ML
0.5 INJECTION, SOLUTION INFILTRATION; PERINEURAL ONCE AS NEEDED
Status: DISCONTINUED | OUTPATIENT
Start: 2023-09-18 | End: 2023-09-18 | Stop reason: HOSPADM

## 2023-09-18 RX ORDER — DEXAMETHASONE SODIUM PHOSPHATE 4 MG/ML
INJECTION, SOLUTION INTRA-ARTICULAR; INTRALESIONAL; INTRAMUSCULAR; INTRAVENOUS; SOFT TISSUE AS NEEDED
Status: DISCONTINUED | OUTPATIENT
Start: 2023-09-18 | End: 2023-09-18 | Stop reason: SURG

## 2023-09-18 RX ORDER — MIDAZOLAM HYDROCHLORIDE 1 MG/ML
1 INJECTION INTRAMUSCULAR; INTRAVENOUS
Status: DISCONTINUED | OUTPATIENT
Start: 2023-09-18 | End: 2023-09-18 | Stop reason: HOSPADM

## 2023-09-18 RX ORDER — DIPHENHYDRAMINE HYDROCHLORIDE 50 MG/ML
12.5 INJECTION INTRAMUSCULAR; INTRAVENOUS
Status: DISCONTINUED | OUTPATIENT
Start: 2023-09-18 | End: 2023-09-18 | Stop reason: HOSPADM

## 2023-09-18 RX ORDER — BUPIVACAINE HYDROCHLORIDE 5 MG/ML
INJECTION, SOLUTION EPIDURAL; INTRACAUDAL AS NEEDED
Status: DISCONTINUED | OUTPATIENT
Start: 2023-09-18 | End: 2023-09-18 | Stop reason: HOSPADM

## 2023-09-18 RX ORDER — OXYCODONE AND ACETAMINOPHEN 7.5; 325 MG/1; MG/1
1 TABLET ORAL EVERY 4 HOURS PRN
Status: DISCONTINUED | OUTPATIENT
Start: 2023-09-18 | End: 2023-09-18 | Stop reason: HOSPADM

## 2023-09-18 RX ORDER — HYDROCODONE BITARTRATE AND ACETAMINOPHEN 5; 325 MG/1; MG/1
1-2 TABLET ORAL EVERY 6 HOURS PRN
Qty: 20 TABLET | Refills: 0 | Status: SHIPPED | OUTPATIENT
Start: 2023-09-18

## 2023-09-18 RX ORDER — PROMETHAZINE HYDROCHLORIDE 25 MG/1
25 TABLET ORAL ONCE AS NEEDED
Status: DISCONTINUED | OUTPATIENT
Start: 2023-09-18 | End: 2023-09-18 | Stop reason: HOSPADM

## 2023-09-18 RX ORDER — FLUMAZENIL 0.1 MG/ML
0.2 INJECTION INTRAVENOUS AS NEEDED
Status: DISCONTINUED | OUTPATIENT
Start: 2023-09-18 | End: 2023-09-18 | Stop reason: HOSPADM

## 2023-09-18 RX ORDER — LIDOCAINE HYDROCHLORIDE 20 MG/ML
INJECTION, SOLUTION EPIDURAL; INFILTRATION; INTRACAUDAL; PERINEURAL AS NEEDED
Status: DISCONTINUED | OUTPATIENT
Start: 2023-09-18 | End: 2023-09-18 | Stop reason: SURG

## 2023-09-18 RX ORDER — EPHEDRINE SULFATE 50 MG/ML
5 INJECTION, SOLUTION INTRAVENOUS ONCE AS NEEDED
Status: DISCONTINUED | OUTPATIENT
Start: 2023-09-18 | End: 2023-09-18 | Stop reason: HOSPADM

## 2023-09-18 RX ORDER — NALOXONE HCL 0.4 MG/ML
0.2 VIAL (ML) INJECTION AS NEEDED
Status: DISCONTINUED | OUTPATIENT
Start: 2023-09-18 | End: 2023-09-18 | Stop reason: HOSPADM

## 2023-09-18 RX ORDER — FENTANYL CITRATE 50 UG/ML
50 INJECTION, SOLUTION INTRAMUSCULAR; INTRAVENOUS ONCE AS NEEDED
Status: DISCONTINUED | OUTPATIENT
Start: 2023-09-18 | End: 2023-09-18 | Stop reason: HOSPADM

## 2023-09-18 RX ORDER — ONDANSETRON 4 MG/1
4 TABLET, FILM COATED ORAL EVERY 8 HOURS PRN
Qty: 20 TABLET | Refills: 0 | Status: SHIPPED | OUTPATIENT
Start: 2023-09-18 | End: 2023-10-18

## 2023-09-18 RX ORDER — IPRATROPIUM BROMIDE AND ALBUTEROL SULFATE 2.5; .5 MG/3ML; MG/3ML
3 SOLUTION RESPIRATORY (INHALATION) ONCE AS NEEDED
Status: DISCONTINUED | OUTPATIENT
Start: 2023-09-18 | End: 2023-09-18 | Stop reason: HOSPADM

## 2023-09-18 RX ORDER — PROPOFOL 10 MG/ML
VIAL (ML) INTRAVENOUS AS NEEDED
Status: DISCONTINUED | OUTPATIENT
Start: 2023-09-18 | End: 2023-09-18 | Stop reason: SURG

## 2023-09-18 RX ORDER — ROCURONIUM BROMIDE 10 MG/ML
INJECTION, SOLUTION INTRAVENOUS AS NEEDED
Status: DISCONTINUED | OUTPATIENT
Start: 2023-09-18 | End: 2023-09-18 | Stop reason: SURG

## 2023-09-18 RX ORDER — SODIUM CHLORIDE 0.9 % (FLUSH) 0.9 %
3-10 SYRINGE (ML) INJECTION AS NEEDED
Status: DISCONTINUED | OUTPATIENT
Start: 2023-09-18 | End: 2023-09-18 | Stop reason: HOSPADM

## 2023-09-18 RX ORDER — CEFAZOLIN SODIUM 2 G/100ML
2000 INJECTION, SOLUTION INTRAVENOUS ONCE
Status: COMPLETED | OUTPATIENT
Start: 2023-09-18 | End: 2023-09-18

## 2023-09-18 RX ORDER — MAGNESIUM HYDROXIDE 1200 MG/15ML
LIQUID ORAL AS NEEDED
Status: DISCONTINUED | OUTPATIENT
Start: 2023-09-18 | End: 2023-09-18 | Stop reason: HOSPADM

## 2023-09-18 RX ORDER — FAMOTIDINE 10 MG/ML
20 INJECTION, SOLUTION INTRAVENOUS ONCE
Status: COMPLETED | OUTPATIENT
Start: 2023-09-18 | End: 2023-09-18

## 2023-09-18 RX ADMIN — SODIUM CHLORIDE, POTASSIUM CHLORIDE, SODIUM LACTATE AND CALCIUM CHLORIDE 9 ML/HR: 600; 310; 30; 20 INJECTION, SOLUTION INTRAVENOUS at 13:37

## 2023-09-18 RX ADMIN — LIDOCAINE HYDROCHLORIDE 100 MG: 20 INJECTION, SOLUTION EPIDURAL; INFILTRATION; INTRACAUDAL; PERINEURAL at 14:22

## 2023-09-18 RX ADMIN — ROCURONIUM BROMIDE 50 MG: 10 INJECTION, SOLUTION INTRAVENOUS at 14:22

## 2023-09-18 RX ADMIN — CEFAZOLIN SODIUM 2000 MG: 2 INJECTION, SOLUTION INTRAVENOUS at 14:14

## 2023-09-18 RX ADMIN — PROPOFOL 250 MG: 10 INJECTION, EMULSION INTRAVENOUS at 14:22

## 2023-09-18 RX ADMIN — SUGAMMADEX 200 MG: 100 INJECTION, SOLUTION INTRAVENOUS at 15:18

## 2023-09-18 RX ADMIN — MIDAZOLAM 1 MG: 1 INJECTION INTRAMUSCULAR; INTRAVENOUS at 13:37

## 2023-09-18 RX ADMIN — DEXAMETHASONE SODIUM PHOSPHATE 8 MG: 4 INJECTION, SOLUTION INTRAMUSCULAR; INTRAVENOUS at 14:27

## 2023-09-18 RX ADMIN — ONDANSETRON 4 MG: 2 INJECTION INTRAMUSCULAR; INTRAVENOUS at 14:27

## 2023-09-18 RX ADMIN — FAMOTIDINE 20 MG: 10 INJECTION INTRAVENOUS at 13:37

## 2023-09-18 RX ADMIN — HYDROMORPHONE HYDROCHLORIDE 0.5 MG: 1 INJECTION, SOLUTION INTRAMUSCULAR; INTRAVENOUS; SUBCUTANEOUS at 16:07

## 2023-09-18 RX ADMIN — HYDROCODONE BITARTRATE AND ACETAMINOPHEN 1 TABLET: 7.5; 325 TABLET ORAL at 16:10

## 2023-09-18 NOTE — ANESTHESIA PREPROCEDURE EVALUATION
Anesthesia Evaluation     Patient summary reviewed and Nursing notes reviewed   NPO Solid Status: > 8 hours  NPO Liquid Status: > 2 hours           Airway   Mallampati: I  TM distance: >3 FB  Neck ROM: full  Large neck circumference and Possible difficult intubation  Comment: CMAC used during the last intubation  Dental - normal exam         Pulmonary - normal exam    breath sounds clear to auscultation  (+) pneumonia resolved ,sleep apnea on CPAP  Cardiovascular - normal exam    ECG reviewed  Rhythm: regular  Rate: normal    (+) hypertension 2 medications or greater, hyperlipidemia  (-) angina, orthopnea, PND, MENDES      Neuro/Psych  (+) psychiatric history Anxiety and Depression  GI/Hepatic/Renal/Endo    (+) obesity, hiatal hernia, GERD, diabetes mellitus type 2, thyroid problem hypothyroidism and thyroid cancer    ROS Comment: Laparoscopic gastric banding    Musculoskeletal (-) negative ROS    Abdominal   (+) obese   Substance History - negative use     OB/GYN negative ob/gyn ROS         Other      history of cancer      Other Comment: Thyroid CA                  Anesthesia Plan    ASA 3     general     intravenous induction     Anesthetic plan, risks, benefits, and alternatives have been provided, discussed and informed consent has been obtained with: patient.

## 2023-09-18 NOTE — ANESTHESIA POSTPROCEDURE EVALUATION
Patient: Mika Gabriel    Procedure Summary       Date: 09/18/23 Room / Location: [facility] OR [address] [place] [address] [facility]    Anesthesia Start: 1418 Anesthesia Stop: 1537    Procedure: RIGHT 3 TOE MALLET/HAMMER TOE CORRECTION, EXCISION OF MUCOELE (Right: Foot) Diagnosis:     Surgeons: Michael Alexander Jr., MD Provider: Artur Pierce MD    Anesthesia Type: general ASA Status: 3            Anesthesia Type: general    Vitals  Vitals Value Taken Time   /67 09/18/23 1615   Temp     Pulse 66 09/18/23 1620   Resp     SpO2 95 % 09/18/23 1620   Vitals shown include unvalidated device data.        Post Anesthesia Care and Evaluation    Pain management: adequate    Airway patency: patent  Anesthetic complications: No anesthetic complications    Cardiovascular status: acceptable  Respiratory status: acceptable  Hydration status: acceptable    Comments: /65   Pulse 80   Temp 36.9 °C (98.4 °F) (Oral)   Resp 18   SpO2 100%

## 2023-09-18 NOTE — ANESTHESIA PROCEDURE NOTES
Airway  Urgency: elective    Date/Time: 9/18/2023 2:25 PM  Airway not difficult    General Information and Staff    Patient location during procedure: OR  CRNA/CAA: Nhi Menard CRNA    Indications and Patient Condition  Indications for airway management: airway protection    Preoxygenated: yes  Mask difficulty assessment: 1 - vent by mask    Final Airway Details  Final airway type: endotracheal airway      Successful airway: ETT  Cuffed: yes   Successful intubation technique: direct laryngoscopy  Facilitating devices/methods: intubating stylet  Endotracheal tube insertion site: oral  Blade: Gale  Blade size: 4  Cormack-Lehane Classification: grade I - full view of glottis  Placement verified by: chest auscultation and capnometry   Cuff volume (mL): 8  Measured from: lips  Number of attempts at approach: 1  Assessment: lips, teeth, and gum same as pre-op and atraumatic intubation

## 2023-09-18 NOTE — H&P
[facility]   HISTORY AND PHYSICAL    Patient Name: Mika Gabriel  : 1970  MRN: [id number]  Primary Care Physician:  Leonor Vega APRN  Date of admission: 2023    Subjective   Subjective     Chief Complaint     History of Present Illness    The patient is a pleasant 53-year-old male who has had progressive pain and dysfunction related to a right foot third mallet toe with an associated suspected mucinous cyst at the dorsal aspect of the distal interphalangeal joint.  He presents today for elective intervention to include mallet toe correction with excision of suspected mucinous cyst.  Please refer to office H&P for full details.  No significant change in history, exam, plan.    Review of Systems   Review of Systems:  Constitutional: Negative  HENT: Negative  Eyes: Negative  Respiratory: Negative; no shortness of breath  Cardiovascular: Negative; no current chest pain  Gastrointestinal: Negative  : Negative  Skin: Negative except as listed in HPI  Neurological: Negative, no numbness or deficits  Hematological: Negative  Muscoloskeletal: Per HPI               '    Personal History     Past Medical History:   Diagnosis Date    Anxiety and depression     Diabetes mellitus     Disease of thyroid gland     GERD (gastroesophageal reflux disease)     Hammertoe     RIGHT FOOT    Hyperlipidemia     Hypertension     Metastasis from thyroid cancer     TO LUNG    Pneumonia     Sleep apnea, obstructive     cpap       Past Surgical History:   Procedure Laterality Date    COLONOSCOPY N/A 2021    Procedure: COLONOSCOPY to cecum with hot snare polypectomy;  Surgeon: Steve Pickett MD;  Location: [facility] ENDOSCOPY;  Service: Gastroenterology;  Laterality: N/A;  pre-screen  post-polyp, internal hemorrhoids    HIATAL HERNIA REPAIR      LAPAROSCOPIC GASTRIC BANDING      LUNG SURGERY      WEDGE RESECTION    NODE DISSECTION LAPAROSCOPIC      NECK    THYROID SURGERY      TOOTH EXTRACTION         Family  History: family history includes Colon cancer in his sister; Liver cancer in his brother; Pancreatic cancer in his sister; Thyroid cancer in his brother. Otherwise pertinent FHx was reviewed and not pertinent to current issue.    Social History:  reports that he has never smoked. He has never used smokeless tobacco. He reports current alcohol use. He reports that he does not use drugs.    Home Medications:  Semaglutide (2 MG/DOSE), amLODIPine, buPROPion XL, ferrous sulfate, levothyroxine, losartan-hydrochlorothiazide, omeprazole, and rosuvastatin    Allergies:  Allergies   Allergen Reactions    Penicillins Other (See Comments)     Childhood reaction       Objective    Objective     Vitals:   Temp:  [98.4 °F (36.9 °C)] 98.4 °F (36.9 °C)  Heart Rate:  [69] 69  Resp:  [18] 18  BP: (126)/(71) 126/71    Physical Exam    Physical Exam:  Vital signs reviewed.  Constitutional:  Appears well-developed, well nourished.  Cardiovascular: Regular rate.  Pulmonary: Effort normal, symmetric chest expansion, no labored breathing.  Abdominal: Soft, non distended  Neurological: No gross deficits, oriented to person, place, and time.  Skin: Warm and dry  Psychiatric: Normal mood and affect  Musculoskeletal:       Active ankle dorsiflexion and plantarflexion.  Sensation is intact to light touch in sural, saphenous, deep and superficial peroneal, tibial nerve distribution.  Toes are warm and well perfused with brisk capillary refill.           Assessment & Plan   Assessment / Plan     The patient is a pleasant 53-year-old male who has had progressive pain and dysfunction related to a right foot third mallet toe with an associated suspected mucinous cyst at the dorsal aspect of the distal interphalangeal joint.  He presents today for elective intervention to include mallet toe correction with excision of suspected mucinous cyst.  Please refer to office H&P for full details.  No significant change in history, exam, plan.    I did discuss  the risk of infection as well as nail plate growth disturbance following surgery at this location    The risks/benefits of surgery, including pain, infection, wound healing problems, need for future procedures, mal/nonunion, DVT/PE, cardiac event, and/or death were discussed, and the patient elected to proceed with surgery.      Michael Alexander Jr, MD

## 2023-09-18 NOTE — OP NOTE
Procedure Note    Mika Gabriel  9/18/2023    Pre-op Diagnosis:   Right foot third toe mallet toe deformity  Suspected mucinous cyst, right third toe       Post-Op Diagnosis Codes:  Same    Procedure:  Right third mallet toe correction   Excision of mucinous cyst, right dorsal third toe    Surgeon(s):  Michael Alexander Jr., MD    Anesthesia: LMA with digital Block    Estimated Blood Loss: minimal    Specimens:                Specimens       ID Source Type Tests Collected By Collected At Frozen?    A Toe, Right Tissue TISSUE PATHOLOGY EXAM   Michael Alexander Jr., MD 9/18/23 2209     Description: RIGHT FOOT MUCINOUS CYST              Drains: * No LDAs found*    Complications:   None apparent    Disposition:  Stable to PACU for recovery    Indications for procedure:  The patient is a pleasant 53-year-old male who has had progressive pain and dysfunction related to a right foot third mallet toe with an associated suspected mucinous cyst at the dorsal aspect of the distal interphalangeal joint.  He requested surgical intervention.  The above listed procedures were recommended. The risks/benefits of surgery, including pain, infection, wound healing problems, need for future procedures, mal/nonunion, DVT/PE, cardiac event, and/or death were discussed, and the patient elected to proceed with surgery    Procedure in detail:  The correct patient was identified in preoperative holding.  All risks and benefits of surgery were again discussed in detail, and the patient agreed to proceed with surgery.  The operative extremity was confirmed and marked by myself.  Operative consent reviewed and confirmed to be signed by the patient and myself.    At this time, the patient was wheeled to the operative theatre and placed supine on the OR table.  BABATUNDE/SCD placed on nonoperative leg. Anesthesia was induced smoothly by our anesthesia colleagues.    The right lower extremity was prepped and draped in standard sterile fashion.  Appropriate  presurgical timeout was performed, confirming correct patient, correct extremity, correct procedure, availability of sterile instruments/implants, and the administration of intravenous antibiotics in the form of Ancef within one hour of skin incision.    At this time, a longitudinal incision was made over the dorsal aspect of the third toe centered over the distal interphalangeal joint.  Dissection was carried down bluntly taking care to avoid protect the extensor tendon.  I immediately encountered a thinly walled cyst which had the clinical appearance of a mucinous cyst.  It was carefully excised from the underlying soft tissues and underlying DIP joint capsular tissue using a combination of sharp and dull dissection.  It was then sent in formalin as a permanent pathology specimen.    The distal interphalangeal joint was inspected.  Diffuse degenerative changes were noted.  The distal aspect of the middle phalanx was resected with a microsagittal saw.  The base of the distal phalanx was prepared with a curette, rongeur.    A guidewire from the Medline 2.5 mm headed partially-threaded screw set was placed in retrograde fashion across the DIP joint.  It was confirmed to be in appropriate position on multiple views of fluoroscopy.  This was measured, drilled, and appropriate length screw placed with excellent purchase and maintained alignment.      Final fluoroscopic images revealed all hardware to be in appropriate position of appropriate length.  There was excellent alignment at the DIP arthrodesis with good correction of the preoperative mallet toe deformity.    Esmarch tourniquet released.  Brisk capillary refill returned to all toes which pinked up quickly.  Hemostasis achieved.  Wound was thoroughly irrigated with sterile saline and closed with 3-0 Vicryl and 3-0 nylon.  Xeroform, sterile gauze, soft compressive dressing replaced.  Drapes were taken down.  Patient was awoken from anesthesia without apparent  complication and taken to PACU for recovery.            Michael Alexander Jr, MD     Date: 9/18/2023  Time: 15:42 EDT

## 2023-09-21 LAB
LAB AP CASE REPORT: NORMAL
LAB AP DIAGNOSIS COMMENT: NORMAL
PATH REPORT.FINAL DX SPEC: NORMAL
PATH REPORT.GROSS SPEC: NORMAL

## 2023-10-31 ENCOUNTER — TELEPHONE (OUTPATIENT)
Dept: GASTROENTEROLOGY | Facility: CLINIC | Age: 53
End: 2023-10-31
Payer: COMMERCIAL

## 2023-10-31 PROBLEM — K63.5 COLON POLYP: Status: ACTIVE | Noted: 2023-05-29

## 2023-10-31 NOTE — TELEPHONE ENCOUNTER
[facility] TO READ CLIFF [facility] SET UP COLONOSCOPY FOR 3-7-24 TO ARRIVE AT 12:30 MY CHARTED MIRALAX PREP INFO AND SET UP SCOPE PER PATIENT

## 2024-03-07 ENCOUNTER — ANESTHESIA (OUTPATIENT)
Dept: GASTROENTEROLOGY | Facility: HOSPITAL | Age: 54
End: 2024-03-07
Payer: COMMERCIAL

## 2024-03-07 ENCOUNTER — HOSPITAL ENCOUNTER (OUTPATIENT)
Facility: HOSPITAL | Age: 54
Setting detail: HOSPITAL OUTPATIENT SURGERY
Discharge: HOME | End: 2024-03-07
Attending: INTERNAL MEDICINE | Admitting: INTERNAL MEDICINE
Payer: COMMERCIAL

## 2024-03-07 ENCOUNTER — ANESTHESIA EVENT (OUTPATIENT)
Dept: GASTROENTEROLOGY | Facility: HOSPITAL | Age: 54
End: 2024-03-07
Payer: COMMERCIAL

## 2024-03-07 VITALS
SYSTOLIC BLOOD PRESSURE: 112 MMHG | RESPIRATION RATE: 17 BRPM | HEIGHT: 71 IN | OXYGEN SATURATION: 96 % | WEIGHT: 225.2 LBS | DIASTOLIC BLOOD PRESSURE: 69 MMHG | BODY MASS INDEX: 31.53 KG/M2 | HEART RATE: 79 BPM

## 2024-03-07 DIAGNOSIS — K63.5 COLON POLYP: ICD-10-CM

## 2024-03-07 LAB — GLUCOSE BLDC GLUCOMTR-MCNC: 80 MG/DL (ref 70–130)

## 2024-03-07 PROCEDURE — 25010000002 PROPOFOL 10 MG/ML EMULSION: Performed by: NURSE ANESTHETIST, CERTIFIED REGISTERED

## 2024-03-07 PROCEDURE — 25810000003 LACTATED RINGERS PER 1000 ML: Performed by: INTERNAL MEDICINE

## 2024-03-07 PROCEDURE — 82948 REAGENT STRIP/BLOOD GLUCOSE: CPT

## 2024-03-07 PROCEDURE — 88305 TISSUE EXAM BY PATHOLOGIST: CPT | Performed by: INTERNAL MEDICINE

## 2024-03-07 PROCEDURE — 45380 COLONOSCOPY AND BIOPSY: CPT | Performed by: INTERNAL MEDICINE

## 2024-03-07 RX ORDER — SODIUM CHLORIDE 9 MG/ML
40 INJECTION, SOLUTION INTRAVENOUS AS NEEDED
Status: DISCONTINUED | OUTPATIENT
Start: 2024-03-07 | End: 2024-03-07 | Stop reason: HOSPADM

## 2024-03-07 RX ORDER — SODIUM CHLORIDE 0.9 % (FLUSH) 0.9 %
10 SYRINGE (ML) INJECTION EVERY 12 HOURS SCHEDULED
Status: DISCONTINUED | OUTPATIENT
Start: 2024-03-07 | End: 2024-03-07 | Stop reason: HOSPADM

## 2024-03-07 RX ORDER — SODIUM CHLORIDE 0.9 % (FLUSH) 0.9 %
10 SYRINGE (ML) INJECTION AS NEEDED
Status: DISCONTINUED | OUTPATIENT
Start: 2024-03-07 | End: 2024-03-07 | Stop reason: HOSPADM

## 2024-03-07 RX ORDER — SODIUM CHLORIDE, SODIUM LACTATE, POTASSIUM CHLORIDE, CALCIUM CHLORIDE 600; 310; 30; 20 MG/100ML; MG/100ML; MG/100ML; MG/100ML
30 INJECTION, SOLUTION INTRAVENOUS CONTINUOUS PRN
Status: DISCONTINUED | OUTPATIENT
Start: 2024-03-07 | End: 2024-03-07 | Stop reason: HOSPADM

## 2024-03-07 RX ADMIN — PROPOFOL 180 MCG/KG/MIN: 10 INJECTION, EMULSION INTRAVENOUS at 13:31

## 2024-03-07 RX ADMIN — SODIUM CHLORIDE, POTASSIUM CHLORIDE, SODIUM LACTATE AND CALCIUM CHLORIDE 30 ML/HR: 600; 310; 30; 20 INJECTION, SOLUTION INTRAVENOUS at 12:51

## 2024-03-07 NOTE — ANESTHESIA POSTPROCEDURE EVALUATION
Patient: Mika Gabriel    Procedure Summary       Date: 03/07/24 Room / Location: [facility] ENDOSCOPY 5 /  [facility]    Anesthesia Start: 1328 Anesthesia Stop: 1351    Procedure: COLONOSCOPY to cecum into terminal ileum with cold polypectomy Diagnosis:       Colon polyp      (Colon polyp [K63.5])    Surgeons: Steve Pickett MD Provider: Rafael Moon MD    Anesthesia Type: MAC ASA Status: 3            Anesthesia Type: MAC    Vitals  Vitals Value Taken Time   /74 03/07/24 1406   Temp     Pulse 70 03/07/24 1408   Resp 17 03/07/24 1406   SpO2 97 % 03/07/24 1408   Vitals shown include unfiled device data.        Post Anesthesia Care and Evaluation    Patient location during evaluation: PACU  Patient participation: complete - patient participated  Level of consciousness: awake and alert  Pain management: adequate    Airway patency: patent  Anesthetic complications: No anesthetic complications    Cardiovascular status: acceptable  Respiratory status: acceptable  Hydration status: acceptable    Comments: --------------------            03/07/24               1406     --------------------   BP:       111/74     Pulse:               Resp:       17       SpO2:               --------------------  VSS

## 2024-03-07 NOTE — ANESTHESIA PREPROCEDURE EVALUATION
Anesthesia Evaluation     Patient summary reviewed and Nursing notes reviewed                Airway   Mallampati: II  TM distance: >3 FB  Neck ROM: full  Dental      Pulmonary    (+) ,sleep apnea on CPAP  Cardiovascular     ECG reviewed  Rhythm: regular  Rate: normal    (+) hypertension, hyperlipidemia      Neuro/Psych- negative ROS  GI/Hepatic/Renal/Endo    (+) morbid obesity, hiatal hernia, GERD, diabetes mellitus type 2, thyroid problem     Musculoskeletal (-) negative ROS    Abdominal    Substance History   (+) alcohol use     OB/GYN negative ob/gyn ROS         Other      history of cancer                Anesthesia Plan    ASA 3     MAC     intravenous induction     Anesthetic plan, risks, benefits, and alternatives have been provided, discussed and informed consent has been obtained with: patient.    Plan discussed with CRNA.    CODE STATUS:

## 2024-03-07 NOTE — DISCHARGE INSTRUCTIONS
For the next 24 hours patient needs to be with a responsible adult.    For 24 hours DO NOT drive, operate machinery, appliances, drink alcohol, make important decisions or sign legal documents.    Start with a light or bland diet if you are feeling sick to your stomach otherwise advance to regular diet as tolerated.    Follow recommendations on procedure report if provided by your doctor.    Call Dr Pickett for problems [phone]    Problems may include but not limited to: large amounts of bleeding, trouble breathing, repeated vomiting, severe unrelieved pain, fever or chills.

## 2024-03-07 NOTE — H&P
[facility]  Pre Procedure History & Physical    Chief Complaint:   Time for my colonoscopy    Subjective     HPI:   53 y.o. male [employer]  who last had a colonoscopy in 12 of 2021 when a 7 mm right colon polyp that was removed and was an inflamed hyperplastic polyp.  I wanted to repeat a colonoscopy in 1 year to make sure that all of that polyp was removed.    Past Medical History:   Past Medical History:   Diagnosis Date    Anxiety and depression     Diabetes mellitus     Disease of thyroid gland     GERD (gastroesophageal reflux disease)     Hammertoe     RIGHT FOOT    Hyperlipidemia     Hypertension     Metastasis from thyroid cancer     TO LUNG    Pneumonia     Sleep apnea, obstructive     cpap       Family History:  Family History   Problem Relation Age of Onset    Pancreatic cancer Sister     Colon cancer Sister     Liver cancer Brother     Thyroid cancer Brother     Malig Hyperthermia Neg Hx        Social History:   reports that he has never smoked. He has never used smokeless tobacco. He reports current alcohol use. He reports that he does not use drugs.    Medications:   No medications prior to admission.       Allergies:  Penicillins    ROS:    Pertinent items are noted in HPI     Objective     There were no vitals taken for this visit.    Physical Exam   Constitutional: Pt is oriented to person, place, and time and well-developed, well-nourished, and in no distress.   HENT:   Mouth/Throat: Oropharynx is clear and moist.   Neck: Normal range of motion. Neck supple.   Cardiovascular: Normal rate, regular rhythm and normal heart sounds.    Pulmonary/Chest: Effort normal and breath sounds normal. No respiratory distress. No  wheezes.   Abdominal: Soft. Bowel sounds are normal.   Skin: Skin is warm and dry.   Psychiatric: Mood, memory, affect and judgment normal.     Assessment & Plan     Diagnosis:  53 y.o. male [employer]  who last had a colonoscopy in 12 of 2021 when a 7  mm right colon polyp that was removed and was an inflamed hyperplastic polyp.  I wanted to repeat a colonoscopy in 1 year to make sure that all of that polyp was removed.    Anticipated Surgical Procedure:  Colonoscopy    The risks, benefits, and alternatives of this procedure have been discussed with the patient or the responsible party- the patient understands and agrees to proceed.    Steve Pickett M.D.

## 2024-03-08 LAB
LAB AP CASE REPORT: NORMAL
PATH REPORT.FINAL DX SPEC: NORMAL
PATH REPORT.GROSS SPEC: NORMAL

## 2024-03-11 ENCOUNTER — TELEPHONE (OUTPATIENT)
Dept: GASTROENTEROLOGY | Facility: CLINIC | Age: 54
End: 2024-03-11
Payer: COMMERCIAL

## 2024-03-11 NOTE — PROGRESS NOTES
03/10/24       Tell him that the colon polyp that was removed was not cancerous and not precancerous, which is good. I recommend a repeat colonosocpy in 5 yrs. Send a copy of this report to his PCP.   Hadley auguste

## 2024-03-11 NOTE — TELEPHONE ENCOUNTER
Patient notified of results and recommendations and verbalized understanding     Result note routed to PCP via [facility]    Hm and cs recall placed for 3/7/29

## 2024-03-11 NOTE — TELEPHONE ENCOUNTER
----- Message from Steve Pickett MD sent at 3/10/2024  8:29 PM EDT -----  03/10/24       Tell him that the colon polyp that was removed was not cancerous and not precancerous, which is good. I recommend a repeat colonosocpy in 5 yrs. Send a copy of this report to his PCP.   Thx. kjh

## 2024-04-06 ENCOUNTER — HOSPITAL ENCOUNTER (EMERGENCY)
Facility: HOSPITAL | Age: 54
Discharge: HOME | End: 2024-04-06
Attending: STUDENT IN AN ORGANIZED HEALTH CARE EDUCATION/TRAINING PROGRAM
Payer: COMMERCIAL

## 2024-04-06 VITALS
HEIGHT: 71 IN | TEMPERATURE: 97.1 F | RESPIRATION RATE: 16 BRPM | BODY MASS INDEX: 32.2 KG/M2 | DIASTOLIC BLOOD PRESSURE: 68 MMHG | SYSTOLIC BLOOD PRESSURE: 129 MMHG | WEIGHT: 230 LBS | HEART RATE: 80 BPM | OXYGEN SATURATION: 97 %

## 2024-04-06 DIAGNOSIS — S01.25XA DOG BITE OF NOSE, INITIAL ENCOUNTER: Primary | ICD-10-CM
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
## 2024-04-06 PROCEDURE — 99283 EMERGENCY DEPT VISIT LOW MDM: CPT

## 2024-04-06 RX ORDER — CLINDAMYCIN HYDROCHLORIDE 300 MG/1
300 CAPSULE ORAL 3 TIMES DAILY
Qty: 15 CAPSULE | Refills: 0 | Status: SHIPPED | OUTPATIENT
Start: 2024-04-06

## 2024-04-06 NOTE — ED PROVIDER NOTES
EMERGENCY DEPARTMENT MD ATTESTATION NOTE    Room Number:  26/26  PCP: Leonor Vega APRN  Independent Historians: Patient    HPI:    Context: Mika Gabriel is a 53 y.o. male with a medical history of HTN who presents to the ED c/o acute dog bite to the face.  Dog is known to the patient is up-to-date on all shots.  Patient's tetanus is up-to-date.  Patient has superficial injuries to the nose and cheek.      Prescription drug monitoring program review:           PHYSICAL EXAM    I have reviewed the triage vital signs and nursing notes.    ED Triage Vitals   Temp Heart Rate Resp BP SpO2   04/06/24 1255 04/06/24 1255 04/06/24 1255 04/06/24 1259 04/06/24 1255   97.1 °F (36.2 °C) 80 16 (!) 127/103 97 %      Temp src Heart Rate Source Patient Position BP Location FiO2 (%)   04/06/24 1255 04/06/24 1255 -- -- --   Tympanic Monitor              MEDICATIONS GIVEN IN ER  Medications - No data to display      ORDERS PLACED DURING THIS VISIT:  Orders Placed This Encounter   Procedures    Wound Care     PROGRESS, DATA ANALYSIS, CONSULTS, AND MEDICAL DECISION MAKING  All labs have been independently interpreted by me.  All radiology studies have been reviewed by me. All EKG's have been independently viewed and interpreted by me.  Discussion below represents my analysis of pertinent findings related to patient's condition, differential diagnosis, treatment plan and final disposition.    Differential diagnosis includes but is not limited to abrasion, laceration, facial injury.    Clinical Scores:                   ED Course as of 04/06/24 1438   Sat Apr 06, 2024   1435 Patient presentation and evaluation consistent with uncomplicated superficial dog bite to the right-sided nose.  No indication for primary closure of the wound.  Plan to cover with antibiotics and have him follow-up as an outpatient with PCP for further management.  No indication for admission or imaging at this time.  All questions answered and close  return precautions given. [DC]      ED Course User Index  [DC] Dale Yi, PA       MDM: 53-year-old male presenting for evaluation of superficial injuries following a dog bite.  Will initiate antibiotic therapy.  Patient's tetanus is up-to-date.  No indication for closure at this time.  Patient discharged follow-up on outpatient basis.      COMPLEXITY OF CARE  Admission was considered but after careful review of the patient's presentation, physical examination, diagnostic results, and response to treatment the patient may be safely discharged with outpatient follow-up.    Please note that portions of this document were completed with a voice recognition program.    Note Disclaimer: At [facility], we believe that sharing information builds trust and better relationships. You are receiving this note because you recently visited [facility]. It is possible you will see health information before a provider has talked with you about it. This kind of information can be easy to misunderstand. To help you fully understand what it means for your health, we urge you to discuss this note with your provider.         Maruy Gilbert MD  04/06/24 3142

## 2024-04-06 NOTE — ED NOTES
"Pt to Er via PV from home for dog bite. PT said his own dog bit him while he was \"saving his life from falling off the couch\"  "

## 2024-04-06 NOTE — ED PROVIDER NOTES
EMERGENCY DEPARTMENT ENCOUNTER      Room Number:  26/26  PCP: Leonor Vega APRN  Independent Historians: Patient  Patient Care Team:  Leonor Vega APRN as PCP - General (Family Medicine)       HPI:  Chief Complaint: Dog bite    A complete HPI/ROS/PMH/PSH/SH/FH are unobtainable due to:     Chronic or social conditions impacting patient care (Social Determinants of Health):       Context: Mika Gabriel is a 53 y.o. male with a PMH significant for GERD, hypertension who presents to the ED c/o acute dog bite to the nose that occurred just prior to coming in.  The patient has a dachshund who was falling off the couch and he grabbed the dog awkwardly and subsequently got bit on the nose.  He had some bleeding from the inside of the nose as well as several superficial appearing wounds to the outside.  Bleeding controlled at this time.  No soreness or bruising across the bridge of the nose or onto the face.  He is up-to-date on his tetanus vaccination.  The dog is up-to-date on rabies shots.      Upon review of prior external notes (non-ED) -and- Review of prior external test results outside of this encounter it appears the patient was evaluated in the office with endocrinology for thyroid cancer on 3/5/2024.  The patient had a normal glucose and BNP on 9/18/2023 and 9/12/2023 respectively.      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    Local recurrence of malignant neoplasm of thyroid gland  11/18/2020    Hiatal hernia 11/18/2020    Gastroesophageal reflux disease 11/18/2020    Diverticular disease 11/18/2020    Lipid screening 07/21/2021    Diabetes due to underlying condition w oth skin comp 07/21/2021    Comprehensive diabetic foot examination, type 2 DM, encounter for 07/21/2021    Complicated grief 07/21/2021    Post-trauma response 07/21/2021    History of gastric ulcer 01/27/2021    Severe obesity 01/27/2015    Postoperative hypothyroidism 11/18/2021    Personal history of kidney stones 07/06/2022    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    History of malignant neoplasm metastatic to lung 01/27/2021    Sleep apnea 09/09/2020    Colon polyp 05/29/2023     Resolved Ambulatory Problems     Diagnosis Date Noted    Thyroid mass 12/18/2014     Past Medical History:   Diagnosis Date    Anxiety and depression     Diabetes mellitus     Disease of thyroid gland     GERD (gastroesophageal reflux disease)     Hammertoe     History of colon polyps     History of COVID-19     Metastasis from thyroid cancer     Pneumonia     Sleep apnea, obstructive          PAST SURGICAL HISTORY  Past Surgical History:   Procedure Laterality Date    COLONOSCOPY N/A 12/20/2021    Procedure: COLONOSCOPY to cecum with hot snare polypectomy;  Surgeon: Steve Pickett MD;  Location: [facility] ENDOSCOPY;  Service: Gastroenterology;  Laterality: N/A;  pre-screen  post-polyp, internal hemorrhoids    COLONOSCOPY N/A 3/7/2024    Procedure: COLONOSCOPY to cecum into terminal ileum with cold polypectomy;  Surgeon: Steve Pickett MD;  Location: [facility] ENDOSCOPY;  Service: Gastroenterology;  Laterality: N/A;  pre: history of colon polyps  post: polyp, internal hemorrhoids    HAMMER TOE REPAIR Right 09/18/2023    Procedure: RIGHT 3 TOE MALLET/HAMMER TOE CORRECTION, EXCISION OF MUCOELE;  Surgeon: Michael Alexander Jr., MD;  Location:  [facility];  Service: Orthopedics;  Laterality: Right;    HIATAL HERNIA REPAIR      LAPAROSCOPIC GASTRIC BANDING       LUNG SURGERY      WEDGE RESECTION    NODE DISSECTION LAPAROSCOPIC      NECK    THYROID SURGERY      TONSILLECTOMY      TOOTH EXTRACTION           FAMILY HISTORY  Family History   Problem Relation Age of Onset    Pancreatic cancer Sister     Colon cancer Sister     Liver cancer Brother     Thyroid cancer Brother     Malig Hyperthermia Neg Hx          SOCIAL HISTORY  Social History     Socioeconomic History    Marital status:    Tobacco Use    Smoking status: Never    Smokeless tobacco: Never   Substance and Sexual Activity    Alcohol use: Yes     Comment: OCCASIONALLY    Drug use: No    Sexual activity: Yes     Partners: Female         ALLERGIES  Penicillins      REVIEW OF SYSTEMS  Included in HPI  All systems reviewed and negative except for those discussed in HPI.      PHYSICAL EXAM    I have reviewed the triage vital signs and nursing notes.    ED Triage Vitals   Temp Heart Rate Resp BP SpO2   04/06/24 1255 04/06/24 1255 04/06/24 1255 04/06/24 1259 04/06/24 1255   97.1 °F (36.2 °C) 80 16 (!) 127/103 97 %      Temp src Heart Rate Source Patient Position BP Location FiO2 (%)   04/06/24 1255 04/06/24 1255 -- -- --   Tympanic Monitor          Physical Exam  Constitutional:       General: He is not in acute distress.     Appearance: He is well-developed.   HENT:      Head: Normocephalic and atraumatic.     Eyes:      General: No scleral icterus.     Conjunctiva/sclera: Conjunctivae normal.   Neck:      Trachea: No tracheal deviation.   Cardiovascular:      Rate and Rhythm: Normal rate and regular rhythm.   Pulmonary:      Effort: Pulmonary effort is normal.      Breath sounds: Normal breath sounds.   Abdominal:      Palpations: Abdomen is soft.      Tenderness: There is no abdominal tenderness. There is no guarding.   Musculoskeletal:         General: No deformity.      Cervical back: Normal range of motion.   Lymphadenopathy:      Cervical: No cervical adenopathy.   Skin:     General: Skin is warm and dry.    Neurological:      Mental Status: He is alert and oriented to person, place, and time.   Psychiatric:         Behavior: Behavior normal.         Vital signs and nursing notes reviewed.      PPE: I wore a surgical mask throughout my encounters with the pt. I performed hand hygiene on entry into the pt room and upon exit.     LAB RESULTS  No results found for this or any previous visit (from the past 24 hour(s)).      RADIOLOGY  No Radiology Exams Resulted Within Past 24 Hours      MEDICATIONS GIVEN IN ER  Medications - No data to display      ORDERS PLACED DURING THIS VISIT:  Orders Placed This Encounter   Procedures    Wound Care         OUTPATIENT MEDICATION MANAGEMENT:  No current Epic-ordered facility-administered medications on file.     Current Outpatient Medications Ordered in Epic   Medication Sig Dispense Refill    amLODIPine (NORVASC) 10 MG tablet TAKE 1 TABLET BY MOUTH DAILY 90 tablet 0    buPROPion XL (WELLBUTRIN XL) 300 MG 24 hr tablet Take 1 tablet by mouth Daily.      clindamycin (CLEOCIN) 300 MG capsule Take 1 capsule by mouth 3 (Three) Times a Day. 15 capsule 0    ferrous sulfate 325 (65 FE) MG EC tablet Take 1 tablet by mouth Daily With Breakfast.      HYDROcodone-acetaminophen (NORCO) 5-325 MG per tablet Take 1-2 tablets by mouth Every 6 (Six) Hours As Needed for Moderate Pain (1 tab pain 3-6/10, 2 tab pain 7-10/10). 20 tablet 0    levothyroxine (SYNTHROID, LEVOTHROID) 175 MCG tablet Take 300 mcg by mouth Daily.      losartan-hydrochlorothiazide (HYZAAR) 100-25 MG per tablet Take 1 tablet by mouth Daily.      omeprazole (priLOSEC) 40 MG capsule Take 1 capsule by mouth Daily. 90 capsule 0    Ozempic, 2 MG/DOSE, 8 MG/3ML solution pen-injector Inject 2 mg under the skin into the appropriate area as directed 1 (One) Time Per Week. Holding for 2 weeks prior to scope      rosuvastatin (CRESTOR) 40 MG tablet Take 1 tablet by mouth Daily.               PROGRESS, DATA ANALYSIS, CONSULTS, AND MEDICAL  DECISION MAKING  All labs have been independently interpreted by me.  All radiology studies have been reviewed by me. All EKG's have been independently viewed and interpreted by me.  Discussion below represents my analysis of pertinent findings related to patient's condition, differential diagnosis, treatment plan and final disposition.      DIFFERENTIAL DIAGNOSIS INCLUDE BUT NOT LIMITED TO:     Laceration, puncture wound, abrasion, avulsion, dog bite    Clinical Scores: N/A      ED Course as of 04/06/24 1437   Sat Apr 06, 2024   1435 Patient presentation and evaluation consistent with uncomplicated superficial dog bite to the right-sided nose.  No indication for primary closure of the wound.  Plan to cover with antibiotics and have him follow-up as an outpatient with PCP for further management.  No indication for admission or imaging at this time.  All questions answered and close return precautions given. [DC]      ED Course User Index  [DC] Dale Yi, PA         1438 I rechecked the patient.  I discussed the patient's labs, radiology findings (including all incidental findings), diagnosis, and plan for discharge.  A repeat exam reveals no new worrisome changes from my initial exam findings.  The patient understands that the fact that they are being discharged does not denote that nothing is abnormal, it indicates that no clinical emergency is present and that they must follow-up as directed in order to properly maintain their health.  Follow-up instructions (specifically listed below) and return to ER precautions were given at this time.  I specifically instructed the patient to follow-up with their PCP.  The patient understands and agrees with the plan, and is ready for discharge.  All questions answered.        AS OF 14:37 EDT VITALS:    BP - 129/68  HR - 80  TEMP - 97.1 °F (36.2 °C) (Tympanic)  O2 SATS - 97%    COMPLEXITY OF CARE  Admission was considered but after careful review of the patient's  presentation, physical examination, diagnostic results, and response to treatment the patient may be safely discharged with outpatient follow-up.      DIAGNOSIS  Final diagnoses:   Dog bite of nose, initial encounter         DISPOSITION  ED Disposition       ED Disposition   Discharge    Condition   Stable    Comment   --                Please note that portions of this document were completed with a voice recognition program.    Note Disclaimer: At [facility], we believe that sharing information builds trust and better relationships. You are receiving this note because you recently visited [facility]. It is possible you will see health information before a provider has talked with you about it. This kind of information can be easy to misunderstand. To help you fully understand what it means for your health, we urge you to discuss this note with your provider.         Dale Yi PA  04/06/24 0024

## 2024-12-13 ENCOUNTER — APPOINTMENT (OUTPATIENT)
Dept: GENERAL RADIOLOGY | Facility: HOSPITAL | Age: 54
End: 2024-12-13
Payer: OTHER MISCELLANEOUS

## 2024-12-13 ENCOUNTER — APPOINTMENT (OUTPATIENT)
Dept: CT IMAGING | Facility: HOSPITAL | Age: 54
End: 2024-12-13
Payer: OTHER MISCELLANEOUS

## 2024-12-13 ENCOUNTER — HOSPITAL ENCOUNTER (EMERGENCY)
Facility: HOSPITAL | Age: 54
Discharge: HOME | End: 2024-12-13
Attending: EMERGENCY MEDICINE
Payer: OTHER MISCELLANEOUS

## 2024-12-13 VITALS
TEMPERATURE: 99 F | DIASTOLIC BLOOD PRESSURE: 78 MMHG | BODY MASS INDEX: 30.8 KG/M2 | HEART RATE: 70 BPM | SYSTOLIC BLOOD PRESSURE: 130 MMHG | HEIGHT: 71 IN | OXYGEN SATURATION: 99 % | WEIGHT: 220 LBS | RESPIRATION RATE: 16 BRPM

## 2024-12-13 DIAGNOSIS — S22.028A: Primary | ICD-10-CM

## 2024-12-13 PROCEDURE — 99284 EMERGENCY DEPT VISIT MOD MDM: CPT

## 2024-12-13 PROCEDURE — 72131 CT LUMBAR SPINE W/O DYE: CPT

## 2024-12-13 PROCEDURE — 72128 CT CHEST SPINE W/O DYE: CPT

## 2024-12-13 PROCEDURE — 99284 EMERGENCY DEPT VISIT MOD MDM: CPT | Performed by: EMERGENCY MEDICINE

## 2024-12-13 PROCEDURE — 73080 X-RAY EXAM OF ELBOW: CPT

## 2024-12-13 PROCEDURE — 71250 CT THORAX DX C-: CPT

## 2024-12-13 RX ORDER — HYDROCODONE BITARTRATE AND ACETAMINOPHEN 5; 325 MG/1; MG/1
1 TABLET ORAL EVERY 6 HOURS PRN
Qty: 12 TABLET | Refills: 0 | Status: SHIPPED | OUTPATIENT
Start: 2024-12-13

## 2024-12-13 RX ORDER — METHOCARBAMOL 750 MG/1
750 TABLET, FILM COATED ORAL 3 TIMES DAILY PRN
Qty: 30 TABLET | Refills: 0 | Status: SHIPPED | OUTPATIENT
Start: 2024-12-13

## 2024-12-13 RX ORDER — HYDROCODONE BITARTRATE AND ACETAMINOPHEN 5; 325 MG/1; MG/1
1 TABLET ORAL ONCE AS NEEDED
Status: DISCONTINUED | OUTPATIENT
Start: 2024-12-13 | End: 2024-12-14 | Stop reason: HOSPADM

## 2024-12-13 RX ORDER — METHOCARBAMOL 500 MG/1
750 TABLET, FILM COATED ORAL ONCE
Status: COMPLETED | OUTPATIENT
Start: 2024-12-13 | End: 2024-12-13

## 2024-12-13 RX ADMIN — HYDROCODONE BITARTRATE AND ACETAMINOPHEN 1 TABLET: 5; 325 TABLET ORAL at 23:13

## 2024-12-13 RX ADMIN — METHOCARBAMOL 750 MG: 500 TABLET ORAL at 22:30

## 2024-12-13 NOTE — Clinical Note
[facility]  [address]  [place] [address]    Mika Gabriel was seen and treated in our emergency department on 12/13/2024.  He may return to work on 12/13/2024.         Thank you for choosing [facility].    Stevo Pierre MD

## 2024-12-13 NOTE — Clinical Note
[facility]  [address]  [place] [address]    Mika Gabriel was seen and treated in our emergency department on 12/13/2024.  He may return to work on 12/13/2024.         Thank you for choosing [facility].    Stevo Pierre MD

## 2024-12-14 NOTE — ED NOTES
Per MD Pierre, pt needs a TLSO brace. Pt will be discharged per MD Pierre and will be called by [facility] in the morning to be fitted for brace. This RN spoke with [facility] for confirmation.  Pt verbalized understanding for plan of care

## 2024-12-14 NOTE — FSED PROVIDER NOTE
[facility]  eMERGENCY dEPARTMENT eNCOUnter      Pt Name: Mika Gabriel  MRN: [id number]  Birthdate 1970  Date of evaluation: 12/13/2024  Provider: Stevo Pierre MD    CHIEF COMPLAINT       Chief Complaint   Patient presents with    Fall       Nurses Notes reviewed and I agree except as noted in the HPI.      HISTORY OF PRESENT ILLNESS       History provided by:  Patient        Mika Gabriel is a 54 y.o. male who presents to the emergency department following a fall.  Pt fell around 1815 off a ladder whole at work. He tells me that he was on a 10 ft ladder and fell.  He landed on his back.  He injured his right elbow as well.  He c/o back pain to the center of his back.  He did not have any LOC at the time.  He tells me that he did not his his head.  He was able to get up after laying there for a bit.  Pt recovered and was able to make it back to his office.  Pot able to walk following the fall but reports feeling like he was hit like a tackle.  Pt did take 4 motrin PTA but does not feel that the med has helped much. Tdap is UTD.      REVIEW OF SYSTEMS       Review of Systems   Musculoskeletal:  Positive for arthralgias (right elbow pain), back pain and myalgias. Negative for gait problem, neck pain and neck stiffness.   Skin:  Positive for wound (abrasion over right elbow.).         PAST MEDICAL HISTORY     Past Medical History:   Diagnosis Date    Anxiety and depression     Diabetes mellitus     Disease of thyroid gland     GERD (gastroesophageal reflux disease)     Hammertoe     RIGHT FOOT    History of colon polyps     History of COVID-19     2022    Hyperlipidemia     Hypertension     Metastasis from thyroid cancer     TO LUNG    Pneumonia     Sleep apnea, obstructive     cpap         SURGICAL HISTORY        has a past surgical history that includes Thyroid surgery; Colonoscopy (N/A, 12/20/2021); Lung surgery; node dissection laparoscopic; Hiatal hernia  repair; Tooth extraction; Laparoscopic gastric banding; Hammer Toe Repair (Right, 09/18/2023); Tonsillectomy; and Colonoscopy (N/A, 3/7/2024).      CURRENT MEDICATIONS          Medication List        START taking these medications      methocarbamol 750 MG tablet  Commonly known as: ROBAXIN  Take 1 tablet by mouth 3 (Three) Times a Day As Needed for Muscle Spasms.            CHANGE how you take these medications      HYDROcodone-acetaminophen 5-325 MG per tablet  Commonly known as: NORCO  Take 1 tablet by mouth Every 6 (Six) Hours As Needed for Moderate Pain.  What changed:   how much to take  reasons to take this            CONTINUE taking these medications      amLODIPine 10 MG tablet  Commonly known as: NORVASC  TAKE 1 TABLET BY MOUTH DAILY     buPROPion  MG 24 hr tablet  Commonly known as: WELLBUTRIN XL     clindamycin 300 MG capsule  Commonly known as: CLEOCIN  Take 1 capsule by mouth 3 (Three) Times a Day.     levothyroxine 175 MCG tablet  Commonly known as: SYNTHROID, LEVOTHROID     losartan-hydrochlorothiazide 100-25 MG per tablet  Commonly known as: HYZAAR     omeprazole 40 MG capsule  Commonly known as: priLOSEC  Take 1 capsule by mouth Daily.     Ozempic (2 MG/DOSE) 8 MG/3ML solution pen-injector  Generic drug: Semaglutide (2 MG/DOSE)     rosuvastatin 40 MG tablet  Commonly known as: CRESTOR               Where to Get Your Medications        These medications were sent to [id number] - [place], [place] - [address] AT [facility] - [phone] [facility] [phone] [place] [address], [facility] [address]      Phone: [phone]   HYDROcodone-acetaminophen 5-325 MG per tablet  methocarbamol 750 MG tablet           ALLERGIES       is allergic to penicillins.      FAMILY HISTORY       He indicated that the status of his sister is unknown. He indicated that the status of his neg hx is unknown.   family history includes Colon cancer in his sister; Liver  "cancer in his brother; Pancreatic cancer in his sister; Thyroid cancer in his brother.    SOCIAL HISTORY        reports that he has never smoked. He has never used smokeless tobacco. He reports current alcohol use. He reports that he does not use drugs.    PHYSICAL EXAM       INITIAL VITALS:  height is 180.3 cm (71\") and weight is 99.8 kg (220 lb). His oral temperature is 99 °F (37.2 °C). His blood pressure is 130/78 and his pulse is 70. His respiration is 16 and oxygen saturation is 99%.      Physical Exam  Vitals and nursing note reviewed.   Constitutional:       Appearance: Normal appearance. He is not ill-appearing.   HENT:      Head: Normocephalic and atraumatic.   Cardiovascular:      Rate and Rhythm: Normal rate and regular rhythm.      Heart sounds: No murmur heard.  Pulmonary:      Effort: Pulmonary effort is normal.      Breath sounds: Normal breath sounds. No wheezing, rhonchi or rales.   Musculoskeletal:         General: Tenderness present. No swelling. Normal range of motion.      Thoracic back: Tenderness and bony tenderness present. Decreased range of motion: Secondary to pain.      Lumbar back: Tenderness (Paraspinous) present. No bony tenderness. Decreased range of motion: Secondary to pain.        Back:    Skin:     General: Skin is warm and dry.      Capillary Refill: Capillary refill takes less than 2 seconds.   Neurological:      General: No focal deficit present.      Mental Status: He is alert and oriented to person, place, and time.   Psychiatric:         Mood and Affect: Mood normal.           DIAGNOSTIC RESULTS     EKG: All EKG's are interpreted by the Emergency Department Physician who either signs or Co-signs this chart in the absence of a cardiologist.        RADIOLOGY: non-plain film images(s) such as CT, Ultrasound and MRI are read by the radiologist.  Plain radiographic images are visualized and preliminarily interpreted by the emergency physician unless otherwise stated " "below.      CT Lumbar Spine Without Contrast   Final Result         Electronically signed by Kobe Ramos MD on 12-13-24 at 2040      CT Thoracic Spine Without Contrast   Final Result         Electronically signed by Kobe Ramos MD on 12-13-24 at 2045      CT Chest Without Contrast Diagnostic   Final Result         Electronically signed by Kobe Ramos MD on 12-13-24 at 2042      XR Elbow 3+ View Right   Final Result          Two radiographic views of the right elbow demonstrate no evidence for  acute fracture or bony malalignment. Incidental note is made of some  lucencies dorsal to the olecranon which could potentially represent some  soft tissue air at the site of a laceration. Please correlate with  clinical data.    FINDINGS:    Vertebrae:  Acute superior endplate fracture at T2.  Minimal height   loss.  No posterior cortical retropulsion.    Soft tissues:  Unremarkable.    Lungs:  Innumerable pulmonary nodules.    Mediastinum:  Mucosal thickening in the distal esophagus with a small   hiatal hernia.     IMPRESSION:       1.  Acute superior endplate fracture at T2.  Minimal height loss.  No   posterior cortical retropulsion.  2.  Mucosal thickening in the distal esophagus with a small hiatal hernia.     3.  Innumerable pulmonary nodules.    FINDINGS:    Vertebrae:  No acute fracture or malalignment.  Unilateral pars defect   on the right at L5.    Soft tissues:  Unremarkable.    Other findings:  No high-grade stenosis.         LABS:   Lab Results (last 24 hours)       ** No results found for the last 24 hours. **            EMERGENCY DEPARTMENT COURSE:   Vitals:    Vitals:    12/13/24 1845   BP: 130/78   BP Location: Left arm   Patient Position: Sitting   Pulse: 70   Resp: 16   Temp: 99 °F (37.2 °C)   TempSrc: Oral   SpO2: 99%   Weight: 99.8 kg (220 lb)   Height: 180.3 cm (71\")       Medical Decision Making  Patient presented to the ED following a fall from a 10 foot ladder.  CT showed that he had a " T2 superior endplate fracture.  Patient will need to be placed in a TLSO brace.  This is for stabilization of the spine for the diagnosis of the T2 fracture.  Patient was treated with muscle relaxer and pain medicine here in the ED.  I will have him follow-up with Ortho.  So he does not have to wait we spoke with Adia who does the TLSO fittings and she will follow-up with him at his house tomorrow.  I will send patient home with pain medicine and muscle relaxer.  After careful review of history, physical and supporting data, there is low suspicion for a life or limb threatening cause of patient's symptoms.  The patient's symptoms have improved from presentation and they appear clinically well.  Patient reexamined prior to discharge and they appear improved.  Patient has been encouraged to follow-up with his/her PCP and to return to the ED with any lack of improvement or worsening symptoms.  The patient's questions regarding the work-up and plan were invited and answered.  The patient states understanding and agreement with discharge planning.      Problems Addressed:  Other closed fracture of second thoracic vertebra, initial encounter: complicated acute illness or injury    Amount and/or Complexity of Data Reviewed  Radiology: ordered.    Risk  Prescription drug management.         The patient was given the following medications:  Medications   HYDROcodone-acetaminophen (NORCO) 5-325 MG per tablet 1 tablet (1 tablet Oral Given 12/13/24 2313)   methocarbamol (ROBAXIN) tablet 750 mg (750 mg Oral Given 12/13/24 2230)            CRITICAL CARE:  none    CONSULTS:  none    PROCEDURES:  Procedures  None    DIFFERENTIAL DIAGNOSIS:     Differential diagnoses include, but not limited to rib fracture, thoracic spine fracture, lumbar fracture, blunt abdominal trauma, blunt chest wall trauma      FINAL IMPRESSION      1. Other closed fracture of second thoracic vertebra, initial encounter          DISPOSITION/PLAN     PATIENT  REFERRED TO:  Leonor Vega, APRN  [address]  [address]  [facility] [address]  [phone]    Call in 3 days      Nikolay Dior II, MD  [address]  [address]  [facility] [id number] [phone]    Call in 3 days  As needed      DISCHARGE MEDICATIONS:     Medication List        START taking these medications      methocarbamol 750 MG tablet  Commonly known as: ROBAXIN  Take 1 tablet by mouth 3 (Three) Times a Day As Needed for Muscle Spasms.            CHANGE how you take these medications      HYDROcodone-acetaminophen 5-325 MG per tablet  Commonly known as: NORCO  Take 1 tablet by mouth Every 6 (Six) Hours As Needed for Moderate Pain.  What changed:   how much to take  reasons to take this            CONTINUE taking these medications      amLODIPine 10 MG tablet  Commonly known as: NORVASC  TAKE 1 TABLET BY MOUTH DAILY     buPROPion  MG 24 hr tablet  Commonly known as: WELLBUTRIN XL     clindamycin 300 MG capsule  Commonly known as: CLEOCIN  Take 1 capsule by mouth 3 (Three) Times a Day.     levothyroxine 175 MCG tablet  Commonly known as: SYNTHROID, LEVOTHROID     losartan-hydrochlorothiazide 100-25 MG per tablet  Commonly known as: HYZAAR     omeprazole 40 MG capsule  Commonly known as: priLOSEC  Take 1 capsule by mouth Daily.     Ozempic (2 MG/DOSE) 8 MG/3ML solution pen-injector  Generic drug: Semaglutide (2 MG/DOSE)     rosuvastatin 40 MG tablet  Commonly known as: CRESTOR               Where to Get Your Medications        These medications were sent to [facility] #[id number] - [place], [place] - [address] AT [facility] [phone] [facility] [phone] [place] [address], [facility] [address]      Phone: [phone]   HYDROcodone-acetaminophen 5-325 MG per tablet  methocarbamol 750 MG tablet         (Please note that portions of this note were completed with a voice recognition program.  Efforts were made to edit the dictations but  occasionally words are mis-transcribed.)    Stevo Pierre MD (electronically signed) Emergency Physician

## (undated) DEVICE — THE SINGLE USE ETRAP – POLYP TRAP IS USED FOR SUCTION RETRIEVAL OF ENDOSCOPICALLY REMOVED POLYPS.: Brand: ETRAP

## (undated) DEVICE — TUBING, SUCTION, 1/4" X 10', STRAIGHT: Brand: MEDLINE

## (undated) DEVICE — KT ORCA ORCAPOD DISP STRL

## (undated) DEVICE — ADAPT CLN BIOGUARD AIR/H2O DISP

## (undated) DEVICE — LN SMPL CO2 SHTRM SD STREAM W/M LUER

## (undated) DEVICE — SINGLE-USE BIOPSY FORCEPS: Brand: RADIAL JAW 4

## (undated) DEVICE — CANN O2 ETCO2 FITS ALL CONN CO2 SMPL A/ 7IN DISP LF

## (undated) DEVICE — SNAR POLYP SENSATION STDOVL 27 240 BX40

## (undated) DEVICE — SENSR O2 OXIMAX FNGR A/ 18IN NONSTR